# Patient Record
Sex: FEMALE | Race: WHITE | HISPANIC OR LATINO | Employment: UNEMPLOYED | URBAN - METROPOLITAN AREA
[De-identification: names, ages, dates, MRNs, and addresses within clinical notes are randomized per-mention and may not be internally consistent; named-entity substitution may affect disease eponyms.]

---

## 2020-06-29 ENCOUNTER — OFFICE VISIT (OUTPATIENT)
Dept: URGENT CARE | Facility: CLINIC | Age: 55
End: 2020-06-29
Payer: MEDICAID

## 2020-06-29 VITALS
HEART RATE: 108 BPM | SYSTOLIC BLOOD PRESSURE: 150 MMHG | DIASTOLIC BLOOD PRESSURE: 99 MMHG | TEMPERATURE: 99 F | OXYGEN SATURATION: 97 %

## 2020-06-29 DIAGNOSIS — H65.193 ACUTE MEE (MIDDLE EAR EFFUSION), BILATERAL: ICD-10-CM

## 2020-06-29 DIAGNOSIS — Z20.822 EXPOSURE TO COVID-19 VIRUS: ICD-10-CM

## 2020-06-29 DIAGNOSIS — R42 DIZZINESS: ICD-10-CM

## 2020-06-29 DIAGNOSIS — B34.9 VIRAL SYNDROME: Primary | ICD-10-CM

## 2020-06-29 PROCEDURE — 99203 OFFICE O/P NEW LOW 30 MIN: CPT | Mod: S$GLB,,, | Performed by: PHYSICIAN ASSISTANT

## 2020-06-29 PROCEDURE — U0003 INFECTIOUS AGENT DETECTION BY NUCLEIC ACID (DNA OR RNA); SEVERE ACUTE RESPIRATORY SYNDROME CORONAVIRUS 2 (SARS-COV-2) (CORONAVIRUS DISEASE [COVID-19]), AMPLIFIED PROBE TECHNIQUE, MAKING USE OF HIGH THROUGHPUT TECHNOLOGIES AS DESCRIBED BY CMS-2020-01-R: HCPCS

## 2020-06-29 PROCEDURE — 99203 PR OFFICE/OUTPT VISIT, NEW, LEVL III, 30-44 MIN: ICD-10-PCS | Mod: S$GLB,,, | Performed by: PHYSICIAN ASSISTANT

## 2020-06-29 RX ORDER — BENZONATATE 100 MG/1
100 CAPSULE ORAL 3 TIMES DAILY PRN
Qty: 30 CAPSULE | Refills: 0 | Status: SHIPPED | OUTPATIENT
Start: 2020-06-29 | End: 2020-07-09

## 2020-06-29 RX ORDER — IPRATROPIUM BROMIDE 21 UG/1
2 SPRAY, METERED NASAL 2 TIMES DAILY PRN
Qty: 30 ML | Refills: 0 | Status: SHIPPED | OUTPATIENT
Start: 2020-06-29

## 2020-06-29 RX ORDER — LISINOPRIL 20 MG/1
20 TABLET ORAL
COMMUNITY

## 2020-06-29 RX ORDER — TRAVOPROST OPHTHALMIC SOLUTION 0.04 MG/ML
1 SOLUTION OPHTHALMIC
COMMUNITY
Start: 2013-11-25

## 2020-06-29 RX ORDER — MECLIZINE HYDROCHLORIDE 25 MG/1
25 TABLET ORAL
Status: SHIPPED | OUTPATIENT
Start: 2020-06-29

## 2020-06-29 RX ORDER — MECLIZINE HYDROCHLORIDE 25 MG/1
25 TABLET ORAL 3 TIMES DAILY PRN
Qty: 30 TABLET | Refills: 0 | Status: SHIPPED | OUTPATIENT
Start: 2020-06-29

## 2020-06-29 NOTE — PROGRESS NOTES
Subjective:       Patient ID: Agnes Worley is a 55 y.o. female.    Vitals:  tympanic temperature is 98.9 °F (37.2 °C). Her blood pressure is 150/99 (abnormal) and her pulse is 108. Her oxygen saturation is 97%.     Chief Complaint: COVID-19 Concerns    55 year old female with a history of HTN and glaucoma who presents to urgent care clinic for evaluation.  Patient's  was exposed to a COVID positive patient 1.5 weeks ago.  Her  got sick last Monday.  She developed symptoms on Wednesday after taking care of him.  On Wednesday, symptoms started with generalized fatigue, body aches, headache, sore throat, runny nose, fever, and dizziness. Dizziness worsened and is associated with nausea and when she gets up or walking. Fever 102 max sat and today 99.4. light and noise worsens headache. Cough with minimal yellow sputum started 2 days ago.  She has been Taking advil and OTC supplements of acv and elderberry.  She feels like her symptoms have overall improved compared to last week.  No other associated symptoms.    Patient denies any nuchal rigidity, vision changes, hearing loss, focal weakness or deficits, or seizure activity.  Patient denies anyloss of taste/smell,  palpitations, difficulty swallowing, swollen glands, chest pain, shortness of breath, dysuria, hematuria, rectal bleeding, bladder/bowel incontinence, flank pain, abdominal pain, vomiting, or diarrhea.        Constitution: Positive for fatigue and fever. Negative for activity change, appetite change, chills, sweating and generalized weakness.   HENT: Positive for congestion, postnasal drip and sore throat. Negative for ear pain, ear discharge, hearing loss, facial swelling, sinus pain, sinus pressure, trouble swallowing and voice change.    Neck: Negative for neck pain, neck stiffness and painful lymph nodes.   Cardiovascular: Negative for chest pain, leg swelling, palpitations, sob on exertion and passing out.   Eyes: Negative for eye  discharge, eye pain, photophobia, vision loss, double vision and blurred vision.   Respiratory: Positive for cough and sputum production. Negative for chest tightness, bloody sputum, COPD, shortness of breath, stridor, wheezing and asthma.    Gastrointestinal: Positive for nausea. Negative for abdominal pain, vomiting, constipation, diarrhea, bright red blood in stool, rectal bleeding, heartburn and bowel incontinence.   Genitourinary: Negative for dysuria, frequency, urgency, urine decreased, flank pain, bladder incontinence, hematuria and history of kidney stones.   Musculoskeletal: Positive for muscle ache. Negative for trauma, joint pain, joint swelling, abnormal ROM of joint and muscle cramps.   Skin: Negative for color change, pale, rash, wound and bruising.   Allergic/Immunologic: Negative for seasonal allergies, asthma and immunocompromised state.   Neurological: Positive for dizziness and headaches. Negative for history of vertigo, light-headedness, passing out, facial drooping, speech difficulty, coordination disturbances, loss of balance, history of migraines, disorientation, altered mental status, loss of consciousness, numbness, tingling and seizures.   Hematologic/Lymphatic: Negative for swollen lymph nodes, easy bruising/bleeding and trouble clotting. Does not bruise/bleed easily.   Psychiatric/Behavioral: Negative for altered mental status, disorientation, nervous/anxious, sleep disturbance and depression. The patient is not nervous/anxious.        Objective:      Physical Exam   Constitutional: She is oriented to person, place, and time. She appears well-developed. She is cooperative.  Non-toxic appearance. She does not appear ill. No distress.   HENT:   Head: Normocephalic and atraumatic.   Right Ear: Hearing, external ear and ear canal normal. No drainage, swelling or tenderness. No mastoid tenderness. Tympanic membrane is bulging. Tympanic membrane is not erythematous. A middle ear effusion is  present.   Left Ear: Hearing, external ear and ear canal normal. No drainage, swelling or tenderness. No mastoid tenderness. Tympanic membrane is bulging. Tympanic membrane is not erythematous. A middle ear effusion is present.   Nose: No rhinorrhea or purulent discharge. Right sinus exhibits maxillary sinus tenderness. Right sinus exhibits no frontal sinus tenderness. Left sinus exhibits maxillary sinus tenderness. Left sinus exhibits no frontal sinus tenderness.   Mouth/Throat: Uvula is midline, oropharynx is clear and moist and mucous membranes are normal. Mucous membranes are moist. No trismus in the jaw. No uvula swelling. No oropharyngeal exudate, posterior oropharyngeal edema or posterior oropharyngeal erythema. Tonsils are 1+ on the right. Tonsils are 1+ on the left. No tonsillar exudate.   Eyes: Pupils are equal, round, and reactive to light. Conjunctivae, EOM and lids are normal. Right eye exhibits no discharge. Left eye exhibits no discharge. Right conjunctiva has no hemorrhage. Left conjunctiva has no hemorrhage.   Neck: Normal range of motion and full passive range of motion without pain. Neck supple. No neck rigidity.   Cardiovascular: Normal rate, regular rhythm, normal heart sounds and normal pulses.   Pulmonary/Chest: Effort normal and breath sounds normal. No accessory muscle usage or stridor. No respiratory distress. She has no wheezes. She exhibits no tenderness.   Abdominal: Soft. Normal appearance and bowel sounds are normal. She exhibits no distension and no mass. There is no splenomegaly or hepatomegaly. There is no abdominal tenderness. There is no rebound, no guarding, no tenderness at McBurney's point and negative Mata's sign.   Musculoskeletal: Normal range of motion.      Right lower leg: No edema.      Left lower leg: No edema.      Comments: 5/5 strength and ROM in all extremities.  Gait normal.   Lymphadenopathy:     She has no cervical adenopathy.   Neurological: She is alert and  oriented to person, place, and time. She has normal motor skills, normal sensation and intact cranial nerves. She displays no weakness and normal reflexes. No cranial nerve deficit or sensory deficit. She exhibits normal muscle tone. Gait and coordination normal. Coordination normal.   Skin: Skin is warm, dry and not diaphoretic. Capillary refill takes less than 2 seconds.   Psychiatric: Her behavior is normal. Thought content normal.   Nursing note and vitals reviewed.        Assessment:       1. Viral syndrome    2. Exposure to Covid-19 Virus    3. Acute VLADIMIR (middle ear effusion), bilateral    4. Dizziness          On exam, patient is nontoxic appearing and vitals are stable.  Patient is essentially neurovascularly intact on exam.  POCT clinic testing done for COVID-19 nasal swab.  COVID-19 precautions were reiterated.  We will notify patient of the results in the next 24-72 hours; can receive results on [facility]. Patient was prescribed medications and recommended OTC treatments for their symptoms.  She was prescribed Meclizine prn dizziness, atrovent for congestion and middle ear effusion, tessalon as needed for for cough, and otc allergy medication with decongestant.      Patient was instructed to return for re-evaluation for any worsening or change in current symptoms. Strict ED versus clinic precautions given in depth. Discharge and follow-up instructions given verbally/printed with the patient who expressed understanding and willingness to comply with my recommendations.  Patient verbalized understanding and agreed with the entirety of plan of care.    Plan:         Viral syndrome  -     COVID-19 Routine Screening    Exposure to Covid-19 Virus  -     COVID-19 Routine Screening    Acute VLADIMIR (middle ear effusion), bilateral  -     meclizine tablet 25 mg  -     ipratropium (ATROVENT) 0.03 % nasal spray; 2 sprays by Nasal route 2 (two) times daily as needed.  Dispense: 30 mL; Refill: 0  -     benzonatate  (TESSALON) 100 MG capsule; Take 1 capsule (100 mg total) by mouth 3 (three) times daily as needed for Cough.  Dispense: 30 capsule; Refill: 0  -     meclizine (ANTIVERT) 25 mg tablet; Take 1 tablet (25 mg total) by mouth 3 (three) times daily as needed for Dizziness or Nausea.  Dispense: 30 tablet; Refill: 0    Dizziness  -     meclizine tablet 25 mg  -     meclizine (ANTIVERT) 25 mg tablet; Take 1 tablet (25 mg total) by mouth 3 (three) times daily as needed for Dizziness or Nausea.  Dispense: 30 tablet; Refill: 0           Patient Instructions       PLEASE READ YOUR DISCHARGE INSTRUCTIONS ENTIRELY AS IT CONTAINS IMPORTANT INFORMATION.    Patient had covid testing done today.  We will notify you of your results in the next 1-3 days. You can also receive the results on my chart. Quarantine at home until you receive results.  Patient understand that they cannot return to work until they are given their results and further recommendations.   If Negative: symptom free without fever reducing meds in 24 hours - can go back to work in 24 hours with surgical mask for 14 days.  If Positive: 3 days since improved symptoms, no fever without fever reducing meds - have to be out for a minimum of 10 days passed from when symptoms first appeared with improvements in respiratory symptoms.    If patient is asymptomatic, patient will still need to be quarantine for at least 10 days from exact known exposure date OR from positive test results date.    Discussed corona virus precautions and reviewed CDC FAC; printed a copy for patient.  I discussed to continue to monitor their symptoms. Discussed that if their symptoms persist or worsen to seek re-evaluation. Clinic vs. ER precautions were given.  Patient verbalized understanding and agreed with the above plan of care.    - Rest.  Drink plenty of fluids.    - Tylenol as directed as needed for fever/pain.  For Tylenol, do not exceed 4000 mg/ day.   -take allergy medicine with  decongestant  - take meclizine as needed for nausea or dizziness      -Below are suggestions for symptomatic relief:              -Nasal saline spray reduces inflammation and dryness.              -Warm face compresses to help with facial sinus pain/pressure.              -Vicks vapor rub at night.              -ATROVENT NASAL SPRAY OR Flonase OTC or Nasacort OTC for nasal congestion.              -Simple foods like chicken noodle soup.              -Delsym helps with coughing at night              -Zyrtec/Claritin during the day & Benadryl at night may help with allergies.  -If you DO NOT have Hypertension or any history of palpitations, it is ok to take over the counter Sudafed or Mucinex D or Allegra-D or Claritin-D or Zyrtec-D.  -If you do take one of the above, it is ok to combine that with plain over the counter Mucinex or Allegra or Claritin or Zyrtec. If, for example, you are taking Zyrtec -D, you can combine that with Mucinex, but not Mucinex-D.  If you are taking Mucinex-D, you can combine that with plain Allegra or Claritin or Zyrtec.   -If you DO have Hypertension or palpitations, it is safe to take Coricidin HBP for relief of sinus symptoms.      -You must understand that you've received an Urgent Care treatment only and that you may be released before all your medical problems are known or treated. You, the patient, will    arrange for follow up care as instructed. Please arrange follow up with your primary medical clinic within 2-5 days if your signs and symptoms have not resolved or worsen.   - Follow up with your PCP or specialty clinic as directed.  You can call [phone] or [phone] to schedule an appointment with the appropriate provider.    - If your condition worsens or fails to improve we recommend that you receive another evaluation at the emergency room immediately or contact your primary medical clinic to discuss your concerns.                Instructions for Patients Awaiting  COVID-19 Test Results    You will either be called with your test result or it will be released to the patient portal.  If you have any questions about your test, please visit [email or web address] or call our COVID-19 information line at [phone].    Prevention steps for patients with confirmed or suspected COVID-19       Stay home and stay away from family members and friends. The CDC says, you can leave home after these three things have happened: 1) You have had no fever for at least 72 hours (that is three full days of no fever without the use of medicine that reduces fevers) 2) AND other symptoms have improved (for example, when your cough or shortness of breath have improved) 3) AND at least 10 days have passed since your symptoms first appeared.   Separate yourself from other people and animals in your home.   Call ahead before visiting your doctor.   Wear a facemask.   Cover your coughs and sneezes.   Wash your hands often with soap and water; hand  can be used, too.   Avoid sharing personal household items.   Wipe down surfaces used daily.   Monitor your symptoms. Seek prompt medical attention if your illness is worsening (e.g., difficulty breathing).    Before seeking care, call your healthcare provider.   If you have a medical emergency and need to call 911, notify the dispatch personnel that you have, or are being evaluated for COVID-19. If possible, put on a facemask before emergency medical services arrive.        Recommended precautions for household members, intimate partners, and caregivers in a home setting of a patient with symptomatic laboratory-confirmed COVID-19 or a patient under investigation.  Household members, intimate partners, and caregivers in the home setting awaiting tests results have close contact with a person with symptomatic, laboratory-confirmed COVID-19 or a person under investigation. Close contacts should monitor their health; they should call  their provider right away if they develop symptoms suggestive of COVID-19 (e.g., fever, cough, shortness of breath).    Close contacts should also follow these recommendations:   Make sure that you understand and can help the patient follow their provider's instructions for medication(s) and care. You should help the patient with basic needs in the home and provide support for getting groceries, prescriptions, and other personal needs.   Monitor the patient's symptoms. If the patient is getting sicker, call his or her healthcare provider and tell them that the patient has laboratory-confirmed COVID-19. If the patient has a medical emergency and you need to call 911, notify the dispatch personnel that the patient has, or is being evaluated for COVID-19.   Household members should stay in another room or be  from the patient. Household members should use a separate bedroom and bathroom, if available.   Prohibit visitors.   Household members should care for any pets in the home.   Make sure that shared spaces in the home have good air flow, such as by an air conditioner or an opened window, weather permitting.   Perform hand hygiene frequently. Wash your hands often with soap and water for at least 20 seconds or use an alcohol-based hand  (that contains > 60% alcohol) covering all surfaces of your hands and rubbing them together until they feel dry. Soap and water should be used preferentially.   Avoid touching your eyes, nose, and mouth.   The patient should wear a facemask. If the patient is not able to wear a facemask (for example, because it causes trouble breathing), caregivers should wear a mask when they are in the same room as the patient.   Wear a disposable facemask and gloves when you touch or have contact with the patient's blood, stool, or body fluids, such as saliva, sputum, nasal mucus, vomit, urine.  o Throw out disposable facemasks and gloves after using them. Do not  reuse.  o When removing personal protective equipment, first remove and dispose of gloves. Then, immediately clean your hands with soap and water or alcohol-based hand . Next, remove and dispose of facemask, and immediately clean your hands again with soap and water or alcohol-based hand .   You should not share dishes, drinking glasses, cups, eating utensils, towels, bedding, or other items with the patient. After the patient uses these items, you should wash them thoroughly (see below Wash laundry thoroughly).   Clean all high-touch surfaces, such as counters, tabletops, doorknobs, bathroom fixtures, toilets, phones, keyboards, tablets, and bedside tables, every day. Also, clean any surfaces that may have blood, stool, or body fluids on them.   Use a household cleaning spray or wipe, according to the label instructions. Labels contain instructions for safe and effective use of the cleaning product including precautions you should take when applying the product, such as wearing gloves and making sure you have good ventilation during use of the product.   Wash laundry thoroughly.  o Immediately remove and wash clothes or bedding that have blood, stool, or body fluids on them.  o Wear disposable gloves while handling soiled items and keep soiled items away from your body. Clean your hands (with soap and water or an alcohol-based hand ) immediately after removing your gloves.  o Read and follow directions on labels of laundry or clothing items and detergent. In general, using a normal laundry detergent according to washing machine instructions and dry thoroughly using the warmest temperatures recommended on the clothing label.   Place all used disposable gloves, facemasks, and other contaminated items in a lined container before disposing of them with other household waste. Clean your hands (with soap and water or an alcohol-based hand ) immediately after handling these  items. Soap and water should be used preferentially if hands are visibly dirty.   Discuss any additional questions with your state or local health department or healthcare provider. Check available hours when contacting your local health department.    For more information see CDC link below.      https://www.cdc.gov/coronavirus/2019-ncov/hcp/guidance-prevent-spread.html#precautions        Sources:  [facility], [facility]          Instructions for Home Care of Patients and Caretakers with Coronavirus Disease 2019     Limit visitors to the home.  Older persons and those that have chronic medical conditions such as diabetes, lung and heart disease are at increased risk for illness.    If possible, patients should use a separate bedroom while recovering. Caregivers and household members should avoid prolonged contact with the patient which means to stay 6 feet away and avoid contact with cough droplets.  When close contact is necessary, wash your hands before and immediately after contact.    Perform hand hygiene frequently. Wash your hands often with soap and water for at least 20 seconds or use an alcohol-based hand , covering all surfaces of your hands and rubbing them together until they feel dry.    Avoid touching your eyes, nose, and mouth with unwashed hands.   Avoid sharing household items with the patient. You should not share dishes, drinking glasses, cups, eating utensils, towels, bedding, or other items. After the patient uses these items, you should wash them thoroughly.   Wash laundry thoroughly.   o Immediately remove and wash clothes or bedding that have blood, stool, or body fluids on them.   Clean all high-touch surfaces, such as counters, tabletops, doorknobs, bathroom fixtures, toilets, phones, keyboards, tablets, and bedside tables, every day.   o Use a household cleaning spray or wipe, according to the label instructions. Labels contain instructions  for safe and effective use of the cleaning product including precautions you should take when applying the product, such as wearing gloves and making sure you have good ventilation during use of the product.    For more information see CDC link below.      https://www.cdc.gov/coronavirus/2019-ncov/hcp/guidance-prevent-spread.html#precautions               If your symptoms worsen or if you have any other concerns, please contact [facility] at [phone].                  Problemas del oído interno: causas del mareo (vértigo)       Vértigo postural angel (BPV, por stephanie siglas en inglés)  Esta es la causa más común de vértigo. El BPV (denominado también BPPV, o vértigo postural paroxístico angel) se produce cuando los hao presentes en los conductos se desplazan a henri posición equivocada. Los episodios suelen suceder cuando la cyndi se mueve de cierta manera, freddie por ejemplo al nubia henri vuelta en la cama, agacharse o mirar hacia arriba. Puesto que el BPV aparece rápidamente, las personas que lo sufren deben considerar si es seguro que manejen o que gillian otras actividades que requieren atención charito:  El BPV:  · Causa episodios de vértigo que solis unos segundos. Pueden producirse varias veces al día, según la posición del cuerpo.  · No provoca pérdida de la audición.  · A menudo se soluciona por alberto cuenta, aunque puede desaparecer más rápido con un tratamiento.  Infección o inflamación  A veces, los conductos semicirculares se hinchan y envían señales de equilibrio incorrectas. Denise problema puede deberse a henri infección viral. Según la causa, la audición puede verse afectada (laberintitis) o permanecer normal (neuronitis). La infección o inflamación:  · Causa episodios de vértigo que solis unas horas o varios días. Generalmente, el primer episodio es el peor.  · Puede producir pérdida de la audición.  · A menudo se tacho por alberto cuenta, aunque puede desaparecer más rápido con un  tratamiento.  · Puede requerir rehabilitación vestibular si usted tiene problemas de equilibrio persistentes.  Enfermedad de Meniere  Esta afección poco frecuente se produce cuando hay demasiado líquido en los conductos. Denise líquido aumenta la presión y causa hinchazón, lo que afecta el equilibrio y las señales auditivas. La enfermedad de Meniere puede:  · Causar episodios de vértigo que solis horas.  · Causar problemas variables de la audición, generalmente en un oído, que empeoran con el tiempo.  · Causar zumbidos o pitidos en los oídos (acúfenos o tinitus).  · Causar henri sensación de llenura o presión en el oído.  · Causar que cualquiera de estos síntomas (vértigo, pérdida de audición, acúfenos o sensación de llenura en los oídos) persista de por betty.  Date Last Reviewed: 5/19/2014  © 3238-2930 [employer]. [address] [place] [address], [place] [address]. Todos los derechos reservados. Esta información no pretende sustituir la atención médica profesional. Sólo alberto médico puede diagnosticar y tratar un problema de paulette.

## 2020-06-29 NOTE — PATIENT INSTRUCTIONS
PLEASE READ YOUR DISCHARGE INSTRUCTIONS ENTIRELY AS IT CONTAINS IMPORTANT INFORMATION.    Patient had covid testing done today.  We will notify you of your results in the next 1-3 days. You can also receive the results on my chart. Quarantine at home until you receive results.  Patient understand that they cannot return to work until they are given their results and further recommendations.   If Negative: symptom free without fever reducing meds in 24 hours - can go back to work in 24 hours with surgical mask for 14 days.  If Positive: 3 days since improved symptoms, no fever without fever reducing meds - have to be out for a minimum of 10 days passed from when symptoms first appeared with improvements in respiratory symptoms.    If patient is asymptomatic, patient will still need to be quarantine for at least 10 days from exact known exposure date OR from positive test results date.    Discussed corona virus precautions and reviewed CDC FAC; printed a copy for patient.  I discussed to continue to monitor their symptoms. Discussed that if their symptoms persist or worsen to seek re-evaluation. Clinic vs. ER precautions were given.  Patient verbalized understanding and agreed with the above plan of care.    - Rest.  Drink plenty of fluids.    - Tylenol as directed as needed for fever/pain.  For Tylenol, do not exceed 4000 mg/ day.   -take allergy medicine with decongestant  - take meclizine as needed for nausea or dizziness      -Below are suggestions for symptomatic relief:              -Nasal saline spray reduces inflammation and dryness.              -Warm face compresses to help with facial sinus pain/pressure.              -Vicks vapor rub at night.              -ATROVENT NASAL SPRAY OR Flonase OTC or Nasacort OTC for nasal congestion.              -Simple foods like chicken noodle soup.              -Delsym helps with coughing at night              -Zyrtec/Claritin during the day & Benadryl at night may help  with allergies.  -If you DO NOT have Hypertension or any history of palpitations, it is ok to take over the counter Sudafed or Mucinex D or Allegra-D or Claritin-D or Zyrtec-D.  -If you do take one of the above, it is ok to combine that with plain over the counter Mucinex or Allegra or Claritin or Zyrtec. If, for example, you are taking Zyrtec -D, you can combine that with Mucinex, but not Mucinex-D.  If you are taking Mucinex-D, you can combine that with plain Allegra or Claritin or Zyrtec.   -If you DO have Hypertension or palpitations, it is safe to take Coricidin HBP for relief of sinus symptoms.      -You must understand that you've received an Urgent Care treatment only and that you may be released before all your medical problems are known or treated. You, the patient, will    arrange for follow up care as instructed. Please arrange follow up with your primary medical clinic within 2-5 days if your signs and symptoms have not resolved or worsen.   - Follow up with your PCP or specialty clinic as directed.  You can call [phone] or [phone] to schedule an appointment with the appropriate provider.    - If your condition worsens or fails to improve we recommend that you receive another evaluation at the emergency room immediately or contact your primary medical clinic to discuss your concerns.                Instructions for Patients Awaiting COVID-19 Test Results    You will either be called with your test result or it will be released to the patient portal.  If you have any questions about your test, please visit [email or web address] or call our COVID-19 information line at [phone].    Prevention steps for patients with confirmed or suspected COVID-19       Stay home and stay away from family members and friends. The CDC says, you can leave home after these three things have happened: 1) You have had no fever for at least 72 hours (that is three full days of no fever without the use  of medicine that reduces fevers) 2) AND other symptoms have improved (for example, when your cough or shortness of breath have improved) 3) AND at least 10 days have passed since your symptoms first appeared.   Separate yourself from other people and animals in your home.   Call ahead before visiting your doctor.   Wear a facemask.   Cover your coughs and sneezes.   Wash your hands often with soap and water; hand  can be used, too.   Avoid sharing personal household items.   Wipe down surfaces used daily.   Monitor your symptoms. Seek prompt medical attention if your illness is worsening (e.g., difficulty breathing).    Before seeking care, call your healthcare provider.   If you have a medical emergency and need to call 911, notify the dispatch personnel that you have, or are being evaluated for COVID-19. If possible, put on a facemask before emergency medical services arrive.        Recommended precautions for household members, intimate partners, and caregivers in a home setting of a patient with symptomatic laboratory-confirmed COVID-19 or a patient under investigation.  Household members, intimate partners, and caregivers in the home setting awaiting tests results have close contact with a person with symptomatic, laboratory-confirmed COVID-19 or a person under investigation. Close contacts should monitor their health; they should call their provider right away if they develop symptoms suggestive of COVID-19 (e.g., fever, cough, shortness of breath).    Close contacts should also follow these recommendations:   Make sure that you understand and can help the patient follow their provider's instructions for medication(s) and care. You should help the patient with basic needs in the home and provide support for getting groceries, prescriptions, and other personal needs.   Monitor the patient's symptoms. If the patient is getting sicker, call his or her healthcare provider and tell them that  the patient has laboratory-confirmed COVID-19. If the patient has a medical emergency and you need to call 911, notify the dispatch personnel that the patient has, or is being evaluated for COVID-19.   Household members should stay in another room or be  from the patient. Household members should use a separate bedroom and bathroom, if available.   Prohibit visitors.   Household members should care for any pets in the home.   Make sure that shared spaces in the home have good air flow, such as by an air conditioner or an opened window, weather permitting.   Perform hand hygiene frequently. Wash your hands often with soap and water for at least 20 seconds or use an alcohol-based hand  (that contains > 60% alcohol) covering all surfaces of your hands and rubbing them together until they feel dry. Soap and water should be used preferentially.   Avoid touching your eyes, nose, and mouth.   The patient should wear a facemask. If the patient is not able to wear a facemask (for example, because it causes trouble breathing), caregivers should wear a mask when they are in the same room as the patient.   Wear a disposable facemask and gloves when you touch or have contact with the patient's blood, stool, or body fluids, such as saliva, sputum, nasal mucus, vomit, urine.  o Throw out disposable facemasks and gloves after using them. Do not reuse.  o When removing personal protective equipment, first remove and dispose of gloves. Then, immediately clean your hands with soap and water or alcohol-based hand . Next, remove and dispose of facemask, and immediately clean your hands again with soap and water or alcohol-based hand .   You should not share dishes, drinking glasses, cups, eating utensils, towels, bedding, or other items with the patient. After the patient uses these items, you should wash them thoroughly (see below Wash laundry thoroughly).   Clean all high-touch  surfaces, such as counters, tabletops, doorknobs, bathroom fixtures, toilets, phones, keyboards, tablets, and bedside tables, every day. Also, clean any surfaces that may have blood, stool, or body fluids on them.   Use a household cleaning spray or wipe, according to the label instructions. Labels contain instructions for safe and effective use of the cleaning product including precautions you should take when applying the product, such as wearing gloves and making sure you have good ventilation during use of the product.   Wash laundry thoroughly.  o Immediately remove and wash clothes or bedding that have blood, stool, or body fluids on them.  o Wear disposable gloves while handling soiled items and keep soiled items away from your body. Clean your hands (with soap and water or an alcohol-based hand ) immediately after removing your gloves.  o Read and follow directions on labels of laundry or clothing items and detergent. In general, using a normal laundry detergent according to washing machine instructions and dry thoroughly using the warmest temperatures recommended on the clothing label.   Place all used disposable gloves, facemasks, and other contaminated items in a lined container before disposing of them with other household waste. Clean your hands (with soap and water or an alcohol-based hand ) immediately after handling these items. Soap and water should be used preferentially if hands are visibly dirty.   Discuss any additional questions with your state or local health department or healthcare provider. Check available hours when contacting your local health department.    For more information see CDC link below.      https://www.cdc.gov/coronavirus/2019-ncov/hcp/guidance-prevent-spread.html#precautions        Sources:  [facility]          Instructions for Home Care of Patients and Caretakers with Coronavirus Disease 2019     Limit visitors  to the home.  Older persons and those that have chronic medical conditions such as diabetes, lung and heart disease are at increased risk for illness.    If possible, patients should use a separate bedroom while recovering. Caregivers and household members should avoid prolonged contact with the patient which means to stay 6 feet away and avoid contact with cough droplets.  When close contact is necessary, wash your hands before and immediately after contact.    Perform hand hygiene frequently. Wash your hands often with soap and water for at least 20 seconds or use an alcohol-based hand , covering all surfaces of your hands and rubbing them together until they feel dry.    Avoid touching your eyes, nose, and mouth with unwashed hands.   Avoid sharing household items with the patient. You should not share dishes, drinking glasses, cups, eating utensils, towels, bedding, or other items. After the patient uses these items, you should wash them thoroughly.   Wash laundry thoroughly.   o Immediately remove and wash clothes or bedding that have blood, stool, or body fluids on them.   Clean all high-touch surfaces, such as counters, tabletops, doorknobs, bathroom fixtures, toilets, phones, keyboards, tablets, and bedside tables, every day.   o Use a household cleaning spray or wipe, according to the label instructions. Labels contain instructions for safe and effective use of the cleaning product including precautions you should take when applying the product, such as wearing gloves and making sure you have good ventilation during use of the product.    For more information see CDC link below.      https://www.cdc.gov/coronavirus/2019-ncov/hcp/guidance-prevent-spread.html#precautions               If your symptoms worsen or if you have any other concerns, please contact [facility] at [phone].                  Problemas del oído interno: causas del mareo (vértigo)       Vértigo postural angel  (BPV, por stephanie siglas en inglés)  Esta es la causa más común de vértigo. El BPV (denominado también BPPV, o vértigo postural paroxístico angel) se produce cuando los hao presentes en los conductos se desplazan a henri posición equivocada. Los episodios suelen suceder cuando la cyndi se mueve de cierta manera, freddie por ejemplo al nubia henri vuelta en la cama, agacharse o mirar hacia arriba. Puesto que el BPV aparece rápidamente, las personas que lo sufren deben considerar si es seguro que manejen o que gillian otras actividades que requieren atención charito:  El BPV:  · Causa episodios de vértigo que solis unos segundos. Pueden producirse varias veces al día, según la posición del cuerpo.  · No provoca pérdida de la audición.  · A menudo se soluciona por alberto cuenta, aunque puede desaparecer más rápido con un tratamiento.  Infección o inflamación  A veces, los conductos semicirculares se hinchan y envían señales de equilibrio incorrectas. Denise problema puede deberse a henri infección viral. Según la causa, la audición puede verse afectada (laberintitis) o permanecer normal (neuronitis). La infección o inflamación:  · Causa episodios de vértigo que solis unas horas o varios días. Generalmente, el primer episodio es el peor.  · Puede producir pérdida de la audición.  · A menudo se tacho por alberto cuenta, aunque puede desaparecer más rápido con un tratamiento.  · Puede requerir rehabilitación vestibular si usted tiene problemas de equilibrio persistentes.  Enfermedad de Meniere  Esta afección poco frecuente se produce cuando hay demasiado líquido en los conductos. Denise líquido aumenta la presión y causa hinchazón, lo que afecta el equilibrio y las señales auditivas. La enfermedad de Meniere puede:  · Causar episodios de vértigo que solis horas.  · Causar problemas variables de la audición, generalmente en un oído, que empeoran con el tiempo.  · Causar zumbidos o pitidos en los oídos (acúfenos o tinitus).  · Causar henri  sensación de llenura o presión en el oído.  · Causar que cualquiera de estos síntomas (vértigo, pérdida de audición, acúfenos o sensación de llenura en los oídos) persista de por betty.  Date Last Reviewed: 5/19/2014  © 9563-8036 [employer]. [address] [place] [address], [place] [address]. Todos los derechos reservados. Esta información no pretende sustituir la atención médica profesional. Sólo alberto médico puede diagnosticar y tratar un problema de paulette.

## 2020-07-03 ENCOUNTER — TELEPHONE (OUTPATIENT)
Dept: URGENT CARE | Facility: CLINIC | Age: 55
End: 2020-07-03

## 2020-07-03 DIAGNOSIS — U07.1 COVID-19 VIRUS DETECTED: ICD-10-CM

## 2020-07-03 LAB — SARS-COV-2 RNA RESP QL NAA+PROBE: DETECTED

## 2020-07-03 RX ORDER — ALBUTEROL SULFATE 90 UG/1
2 AEROSOL, METERED RESPIRATORY (INHALATION) EVERY 6 HOURS PRN
Qty: 8 G | Refills: 0 | Status: SHIPPED | OUTPATIENT
Start: 2020-07-03 | End: 2021-07-03

## 2020-07-03 NOTE — TELEPHONE ENCOUNTER
Called patient to discuss her positive COVID-19 results on 06/19/2020.  Patient reports of significant improvements in her symptoms.  No longer having fever but still having generalized fatigue.  She feels like she is fatigue and has increased work of breathing when she is cleaning the house or with increased physical activity.  Denies any shortness of breath, palpitations, chest pain, or other complaints.  I recommended patient to continue previously prescribed medications on previous visit.  I also sent and albuterol inhaler as needed.  I reiterated CDC guidelines and precautions for COVID-19.  I emphasized strict ER and clinic precautions.  If she develops any difficulty breathing, chest pain, or syncopal episodes, she will need to be evaluated in the ED.  Patient verbalized understanding and agreed with plan of care.

## 2020-08-14 ENCOUNTER — LAB VISIT (OUTPATIENT)
Dept: LAB | Facility: HOSPITAL | Age: 55
End: 2020-08-14
Payer: MEDICAID

## 2020-08-14 DIAGNOSIS — N95.1 SYMPTOMATIC MENOPAUSAL OR FEMALE CLIMACTERIC STATES: ICD-10-CM

## 2020-08-14 DIAGNOSIS — I10 HYPERTENSION: ICD-10-CM

## 2020-08-14 DIAGNOSIS — M13.841 OTHER SPECIFIED ARTHRITIS, RIGHT HAND: Primary | ICD-10-CM

## 2020-08-14 LAB
ANION GAP SERPL CALC-SCNC: 6 MMOL/L (ref 8–16)
BASOPHILS # BLD AUTO: 0.05 K/UL (ref 0–0.2)
BASOPHILS NFR BLD: 0.9 % (ref 0–1.9)
BUN SERPL-MCNC: 8 MG/DL (ref 6–20)
CALCIUM SERPL-MCNC: 9.3 MG/DL (ref 8.7–10.5)
CHLORIDE SERPL-SCNC: 110 MMOL/L (ref 95–110)
CHOLEST SERPL-MCNC: 233 MG/DL (ref 120–199)
CHOLEST/HDLC SERPL: 3.8 {RATIO} (ref 2–5)
CO2 SERPL-SCNC: 25 MMOL/L (ref 23–29)
CREAT SERPL-MCNC: 0.7 MG/DL (ref 0.5–1.4)
DIFFERENTIAL METHOD: NORMAL
EOSINOPHIL # BLD AUTO: 0.2 K/UL (ref 0–0.5)
EOSINOPHIL NFR BLD: 3.8 % (ref 0–8)
ERYTHROCYTE [DISTWIDTH] IN BLOOD BY AUTOMATED COUNT: 12.7 % (ref 11.5–14.5)
ERYTHROCYTE [SEDIMENTATION RATE] IN BLOOD BY WESTERGREN METHOD: 13 MM/HR (ref 0–36)
EST. GFR  (AFRICAN AMERICAN): >60 ML/MIN/1.73 M^2
EST. GFR  (NON AFRICAN AMERICAN): >60 ML/MIN/1.73 M^2
ESTIMATED AVG GLUCOSE: 111 MG/DL (ref 68–131)
ESTRADIOL SERPL-MCNC: <10 PG/ML
GLUCOSE SERPL-MCNC: 92 MG/DL (ref 70–110)
HBA1C MFR BLD HPLC: 5.5 % (ref 4–5.6)
HCT VFR BLD AUTO: 44.1 % (ref 37–48.5)
HDLC SERPL-MCNC: 62 MG/DL (ref 40–75)
HDLC SERPL: 26.6 % (ref 20–50)
HGB BLD-MCNC: 14.2 G/DL (ref 12–16)
IMM GRANULOCYTES # BLD AUTO: 0.01 K/UL (ref 0–0.04)
IMM GRANULOCYTES NFR BLD AUTO: 0.2 % (ref 0–0.5)
LDLC SERPL CALC-MCNC: 153.4 MG/DL (ref 63–159)
LYMPHOCYTES # BLD AUTO: 2.5 K/UL (ref 1–4.8)
LYMPHOCYTES NFR BLD: 42.7 % (ref 18–48)
MCH RBC QN AUTO: 27.4 PG (ref 27–31)
MCHC RBC AUTO-ENTMCNC: 32.2 G/DL (ref 32–36)
MCV RBC AUTO: 85 FL (ref 82–98)
MONOCYTES # BLD AUTO: 0.5 K/UL (ref 0.3–1)
MONOCYTES NFR BLD: 9 % (ref 4–15)
NEUTROPHILS # BLD AUTO: 2.5 K/UL (ref 1.8–7.7)
NEUTROPHILS NFR BLD: 43.4 % (ref 38–73)
NONHDLC SERPL-MCNC: 171 MG/DL
NRBC BLD-RTO: 0 /100 WBC
PLATELET # BLD AUTO: 269 K/UL (ref 150–350)
PMV BLD AUTO: 9.5 FL (ref 9.2–12.9)
POTASSIUM SERPL-SCNC: 4.1 MMOL/L (ref 3.5–5.1)
PROGEST SERPL-MCNC: 0.3 NG/ML
RBC # BLD AUTO: 5.19 M/UL (ref 4–5.4)
RHEUMATOID FACT SERPL-ACNC: <10 IU/ML (ref 0–15)
SODIUM SERPL-SCNC: 141 MMOL/L (ref 136–145)
T3FREE SERPL-MCNC: 3.3 PG/ML (ref 2.3–4.2)
T4 FREE SERPL-MCNC: 1.03 NG/DL (ref 0.71–1.51)
TESTOST SERPL-MCNC: 20 NG/DL (ref 5–73)
TRIGL SERPL-MCNC: 88 MG/DL (ref 30–150)
TSH SERPL DL<=0.005 MIU/L-ACNC: 2.1 UIU/ML (ref 0.4–4)
WBC # BLD AUTO: 5.79 K/UL (ref 3.9–12.7)

## 2020-08-14 PROCEDURE — 80048 BASIC METABOLIC PNL TOTAL CA: CPT

## 2020-08-14 PROCEDURE — 85025 COMPLETE CBC W/AUTO DIFF WBC: CPT

## 2020-08-14 PROCEDURE — 85652 RBC SED RATE AUTOMATED: CPT

## 2020-08-14 PROCEDURE — 84443 ASSAY THYROID STIM HORMONE: CPT

## 2020-08-14 PROCEDURE — 86431 RHEUMATOID FACTOR QUANT: CPT

## 2020-08-14 PROCEDURE — 84144 ASSAY OF PROGESTERONE: CPT

## 2020-08-14 PROCEDURE — 86038 ANTINUCLEAR ANTIBODIES: CPT

## 2020-08-14 PROCEDURE — 80061 LIPID PANEL: CPT

## 2020-08-14 PROCEDURE — 84403 ASSAY OF TOTAL TESTOSTERONE: CPT

## 2020-08-14 PROCEDURE — 82670 ASSAY OF TOTAL ESTRADIOL: CPT

## 2020-08-14 PROCEDURE — 84439 ASSAY OF FREE THYROXINE: CPT

## 2020-08-14 PROCEDURE — 84479 ASSAY OF THYROID (T3 OR T4): CPT

## 2020-08-14 PROCEDURE — 83036 HEMOGLOBIN GLYCOSYLATED A1C: CPT

## 2020-08-14 PROCEDURE — 84481 FREE ASSAY (FT-3): CPT

## 2020-08-16 LAB — T3RU NFR SERPL: 33 % (ref 28–41)

## 2020-08-18 LAB — ANA SER QL IF: NORMAL

## 2020-09-19 ENCOUNTER — LAB VISIT (OUTPATIENT)
Dept: LAB | Facility: HOSPITAL | Age: 55
End: 2020-09-19
Payer: MEDICAID

## 2020-09-19 DIAGNOSIS — N95.1 SYMPTOMATIC MENOPAUSAL OR FEMALE CLIMACTERIC STATES: ICD-10-CM

## 2020-09-19 DIAGNOSIS — E78.01 FAMILIAL HYPERBETALIPOPROTEINEMIA: ICD-10-CM

## 2020-09-19 DIAGNOSIS — R73.9 HYPERGLYCEMIA: Primary | ICD-10-CM

## 2020-09-19 DIAGNOSIS — R73.9 HYPERGLYCEMIA: ICD-10-CM

## 2020-09-19 LAB
ANION GAP SERPL CALC-SCNC: 9 MMOL/L (ref 8–16)
BUN SERPL-MCNC: 7 MG/DL (ref 6–20)
CALCIUM SERPL-MCNC: 8.5 MG/DL (ref 8.7–10.5)
CHLORIDE SERPL-SCNC: 108 MMOL/L (ref 95–110)
CHOLEST SERPL-MCNC: 220 MG/DL (ref 120–199)
CHOLEST/HDLC SERPL: 3.2 {RATIO} (ref 2–5)
CO2 SERPL-SCNC: 24 MMOL/L (ref 23–29)
CREAT SERPL-MCNC: 0.7 MG/DL (ref 0.5–1.4)
EST. GFR  (AFRICAN AMERICAN): >60 ML/MIN/1.73 M^2
EST. GFR  (NON AFRICAN AMERICAN): >60 ML/MIN/1.73 M^2
ESTRADIOL SERPL-MCNC: 185 PG/ML
GLUCOSE SERPL-MCNC: 96 MG/DL (ref 70–110)
HDLC SERPL-MCNC: 68 MG/DL (ref 40–75)
HDLC SERPL: 30.9 % (ref 20–50)
LDLC SERPL CALC-MCNC: 129 MG/DL (ref 63–159)
NONHDLC SERPL-MCNC: 152 MG/DL
POTASSIUM SERPL-SCNC: 3.9 MMOL/L (ref 3.5–5.1)
SODIUM SERPL-SCNC: 141 MMOL/L (ref 136–145)
TRIGL SERPL-MCNC: 115 MG/DL (ref 30–150)

## 2020-09-19 PROCEDURE — 36415 COLL VENOUS BLD VENIPUNCTURE: CPT

## 2020-09-19 PROCEDURE — 82670 ASSAY OF TOTAL ESTRADIOL: CPT

## 2020-09-19 PROCEDURE — 80061 LIPID PANEL: CPT

## 2020-09-19 PROCEDURE — 80048 BASIC METABOLIC PNL TOTAL CA: CPT

## 2020-12-28 DIAGNOSIS — R05.9 COUGH: Primary | ICD-10-CM

## 2020-12-30 ENCOUNTER — HOSPITAL ENCOUNTER (OUTPATIENT)
Dept: RADIOLOGY | Facility: HOSPITAL | Age: 55
Discharge: HOME | End: 2020-12-30
Attending: INTERNAL MEDICINE
Payer: MEDICAID

## 2020-12-30 DIAGNOSIS — R05.9 COUGH: ICD-10-CM

## 2020-12-30 PROCEDURE — 71046 X-RAY EXAM CHEST 2 VIEWS: CPT | Mod: 26,,, | Performed by: RADIOLOGY

## 2020-12-30 PROCEDURE — 71046 X-RAY EXAM CHEST 2 VIEWS: CPT | Mod: TC

## 2020-12-30 PROCEDURE — 71046 XR CHEST PA AND LATERAL: ICD-10-PCS | Mod: 26,,, | Performed by: RADIOLOGY

## 2021-01-04 ENCOUNTER — LAB VISIT (OUTPATIENT)
Dept: LAB | Facility: HOSPITAL | Age: 56
End: 2021-01-04
Attending: INTERNAL MEDICINE
Payer: MEDICAID

## 2021-01-04 DIAGNOSIS — I10 BENIGN ESSENTIAL HYPERTENSION: Primary | ICD-10-CM

## 2021-01-04 DIAGNOSIS — E78.00 PURE HYPERCHOLESTEROLEMIA: ICD-10-CM

## 2021-01-04 LAB
ANION GAP SERPL CALC-SCNC: 10 MMOL/L (ref 8–16)
BUN SERPL-MCNC: 6 MG/DL (ref 6–20)
CALCIUM SERPL-MCNC: 8.8 MG/DL (ref 8.7–10.5)
CHLORIDE SERPL-SCNC: 107 MMOL/L (ref 95–110)
CHOLEST SERPL-MCNC: 209 MG/DL (ref 120–199)
CHOLEST/HDLC SERPL: 3.5 {RATIO} (ref 2–5)
CO2 SERPL-SCNC: 26 MMOL/L (ref 23–29)
CREAT SERPL-MCNC: 0.6 MG/DL (ref 0.5–1.4)
EST. GFR  (AFRICAN AMERICAN): >60 ML/MIN/1.73 M^2
EST. GFR  (NON AFRICAN AMERICAN): >60 ML/MIN/1.73 M^2
GLUCOSE SERPL-MCNC: 83 MG/DL (ref 70–110)
HDLC SERPL-MCNC: 59 MG/DL (ref 40–75)
HDLC SERPL: 28.2 % (ref 20–50)
LDLC SERPL CALC-MCNC: 107.4 MG/DL (ref 63–159)
NONHDLC SERPL-MCNC: 150 MG/DL
POTASSIUM SERPL-SCNC: 3.8 MMOL/L (ref 3.5–5.1)
SODIUM SERPL-SCNC: 143 MMOL/L (ref 136–145)
TRIGL SERPL-MCNC: 213 MG/DL (ref 30–150)

## 2021-01-04 PROCEDURE — 80048 BASIC METABOLIC PNL TOTAL CA: CPT

## 2021-01-04 PROCEDURE — 36415 COLL VENOUS BLD VENIPUNCTURE: CPT

## 2021-01-04 PROCEDURE — 80061 LIPID PANEL: CPT

## 2021-04-09 ENCOUNTER — IMMUNIZATION (OUTPATIENT)
Dept: PHARMACY | Facility: CLINIC | Age: 56
End: 2021-04-09

## 2021-04-09 DIAGNOSIS — Z23 NEED FOR VACCINATION: Primary | ICD-10-CM

## 2021-04-30 ENCOUNTER — LAB VISIT (OUTPATIENT)
Dept: LAB | Facility: HOSPITAL | Age: 56
End: 2021-04-30
Attending: INTERNAL MEDICINE
Payer: COMMERCIAL

## 2021-04-30 DIAGNOSIS — E78.00 HYPERCHOLESTEREMIA: ICD-10-CM

## 2021-04-30 DIAGNOSIS — E28.39 PREMATURE OVARIAN FAILURE: ICD-10-CM

## 2021-04-30 DIAGNOSIS — I10 ESSENTIAL HYPERTENSION, MALIGNANT: Primary | ICD-10-CM

## 2021-04-30 DIAGNOSIS — R68.2 DRY MOUTH: ICD-10-CM

## 2021-04-30 LAB
ANION GAP SERPL CALC-SCNC: 10 MMOL/L (ref 8–16)
BUN SERPL-MCNC: 8 MG/DL (ref 6–20)
CALCIUM SERPL-MCNC: 9.4 MG/DL (ref 8.7–10.5)
CHLORIDE SERPL-SCNC: 110 MMOL/L (ref 95–110)
CHOLEST SERPL-MCNC: 207 MG/DL (ref 120–199)
CHOLEST/HDLC SERPL: 3.5 {RATIO} (ref 2–5)
CO2 SERPL-SCNC: 21 MMOL/L (ref 23–29)
CREAT SERPL-MCNC: 0.7 MG/DL (ref 0.5–1.4)
EST. GFR  (AFRICAN AMERICAN): >60 ML/MIN/1.73 M^2
EST. GFR  (NON AFRICAN AMERICAN): >60 ML/MIN/1.73 M^2
ESTRADIOL SERPL-MCNC: 62 PG/ML
GLUCOSE SERPL-MCNC: 91 MG/DL (ref 70–110)
HDLC SERPL-MCNC: 59 MG/DL (ref 40–75)
HDLC SERPL: 28.5 % (ref 20–50)
LDLC SERPL CALC-MCNC: 129.2 MG/DL (ref 63–159)
NONHDLC SERPL-MCNC: 148 MG/DL
POTASSIUM SERPL-SCNC: 3.9 MMOL/L (ref 3.5–5.1)
SODIUM SERPL-SCNC: 141 MMOL/L (ref 136–145)
TRIGL SERPL-MCNC: 94 MG/DL (ref 30–150)
URATE SERPL-MCNC: 4.8 MG/DL (ref 2.4–5.7)

## 2021-04-30 PROCEDURE — 86235 NUCLEAR ANTIGEN ANTIBODY: CPT | Performed by: INTERNAL MEDICINE

## 2021-04-30 PROCEDURE — 80048 BASIC METABOLIC PNL TOTAL CA: CPT | Performed by: INTERNAL MEDICINE

## 2021-04-30 PROCEDURE — 86235 NUCLEAR ANTIGEN ANTIBODY: CPT | Mod: 59 | Performed by: INTERNAL MEDICINE

## 2021-04-30 PROCEDURE — 86038 ANTINUCLEAR ANTIBODIES: CPT | Performed by: INTERNAL MEDICINE

## 2021-04-30 PROCEDURE — 84550 ASSAY OF BLOOD/URIC ACID: CPT | Performed by: INTERNAL MEDICINE

## 2021-04-30 PROCEDURE — 80061 LIPID PANEL: CPT | Performed by: INTERNAL MEDICINE

## 2021-04-30 PROCEDURE — 36415 COLL VENOUS BLD VENIPUNCTURE: CPT | Performed by: INTERNAL MEDICINE

## 2021-04-30 PROCEDURE — 82670 ASSAY OF TOTAL ESTRADIOL: CPT | Performed by: INTERNAL MEDICINE

## 2021-05-03 LAB
ANA SER QL IF: NORMAL
ANTI-SSA ANTIBODY: 0.1 RATIO (ref 0–0.99)
ANTI-SSA INTERPRETATION: NEGATIVE
ANTI-SSB ANTIBODY: 0.06 RATIO (ref 0–0.99)
ANTI-SSB INTERPRETATION: NEGATIVE

## 2021-05-07 ENCOUNTER — IMMUNIZATION (OUTPATIENT)
Dept: PHARMACY | Facility: CLINIC | Age: 56
End: 2021-05-07

## 2021-05-07 DIAGNOSIS — Z23 NEED FOR VACCINATION: Primary | ICD-10-CM

## 2021-06-12 ENCOUNTER — HOSPITAL ENCOUNTER (OUTPATIENT)
Facility: HOSPITAL | Age: 56
Discharge: HOME | End: 2021-06-13
Attending: EMERGENCY MEDICINE | Admitting: STUDENT IN AN ORGANIZED HEALTH CARE EDUCATION/TRAINING PROGRAM
Payer: MEDICAID

## 2021-06-12 DIAGNOSIS — R55 PRE-SYNCOPE: Primary | ICD-10-CM

## 2021-06-12 DIAGNOSIS — R94.31 ABNORMAL EKG: ICD-10-CM

## 2021-06-12 DIAGNOSIS — R07.9 CHEST PAIN: ICD-10-CM

## 2021-06-12 PROCEDURE — 99285 EMERGENCY DEPT VISIT HI MDM: CPT | Mod: 25

## 2021-06-12 PROCEDURE — 96360 HYDRATION IV INFUSION INIT: CPT

## 2021-06-13 VITALS
WEIGHT: 163.13 LBS | HEIGHT: 60 IN | DIASTOLIC BLOOD PRESSURE: 70 MMHG | BODY MASS INDEX: 32.02 KG/M2 | HEART RATE: 80 BPM | SYSTOLIC BLOOD PRESSURE: 141 MMHG | RESPIRATION RATE: 17 BRPM | TEMPERATURE: 98 F | OXYGEN SATURATION: 95 %

## 2021-06-13 PROBLEM — R07.9 CHEST PAIN: Status: ACTIVE | Noted: 2021-06-13

## 2021-06-13 PROBLEM — R55 PRE-SYNCOPE: Status: ACTIVE | Noted: 2021-06-13

## 2021-06-13 PROBLEM — I10 ESSENTIAL HYPERTENSION: Status: ACTIVE | Noted: 2021-06-13

## 2021-06-13 LAB
ALBUMIN SERPL BCP-MCNC: 3.2 G/DL (ref 3.5–5.2)
ALP SERPL-CCNC: 71 U/L (ref 55–135)
ALT SERPL W/O P-5'-P-CCNC: 14 U/L (ref 10–44)
ANION GAP SERPL CALC-SCNC: 12 MMOL/L (ref 8–16)
ANION GAP SERPL CALC-SCNC: 7 MMOL/L (ref 8–16)
AST SERPL-CCNC: 18 U/L (ref 10–40)
BASOPHILS # BLD AUTO: 0.04 K/UL (ref 0–0.2)
BASOPHILS # BLD AUTO: 0.04 K/UL (ref 0–0.2)
BASOPHILS NFR BLD: 0.4 % (ref 0–1.9)
BASOPHILS NFR BLD: 0.4 % (ref 0–1.9)
BILIRUB SERPL-MCNC: 0.3 MG/DL (ref 0.1–1)
BUN SERPL-MCNC: 8 MG/DL (ref 6–20)
BUN SERPL-MCNC: 9 MG/DL (ref 6–20)
CALCIUM SERPL-MCNC: 8.3 MG/DL (ref 8.7–10.5)
CALCIUM SERPL-MCNC: 8.5 MG/DL (ref 8.7–10.5)
CHLORIDE SERPL-SCNC: 106 MMOL/L (ref 95–110)
CHLORIDE SERPL-SCNC: 111 MMOL/L (ref 95–110)
CO2 SERPL-SCNC: 20 MMOL/L (ref 23–29)
CO2 SERPL-SCNC: 23 MMOL/L (ref 23–29)
CREAT SERPL-MCNC: 0.7 MG/DL (ref 0.5–1.4)
CREAT SERPL-MCNC: 0.7 MG/DL (ref 0.5–1.4)
DIFFERENTIAL METHOD: ABNORMAL
DIFFERENTIAL METHOD: NORMAL
EOSINOPHIL # BLD AUTO: 0.3 K/UL (ref 0–0.5)
EOSINOPHIL # BLD AUTO: 0.3 K/UL (ref 0–0.5)
EOSINOPHIL NFR BLD: 3.1 % (ref 0–8)
EOSINOPHIL NFR BLD: 3.3 % (ref 0–8)
ERYTHROCYTE [DISTWIDTH] IN BLOOD BY AUTOMATED COUNT: 12.1 % (ref 11.5–14.5)
ERYTHROCYTE [DISTWIDTH] IN BLOOD BY AUTOMATED COUNT: 12.3 % (ref 11.5–14.5)
EST. GFR  (AFRICAN AMERICAN): >60 ML/MIN/1.73 M^2
EST. GFR  (AFRICAN AMERICAN): >60 ML/MIN/1.73 M^2
EST. GFR  (NON AFRICAN AMERICAN): >60 ML/MIN/1.73 M^2
EST. GFR  (NON AFRICAN AMERICAN): >60 ML/MIN/1.73 M^2
GLUCOSE SERPL-MCNC: 102 MG/DL (ref 70–110)
GLUCOSE SERPL-MCNC: 113 MG/DL (ref 70–110)
HCT VFR BLD AUTO: 38.2 % (ref 37–48.5)
HCT VFR BLD AUTO: 38.4 % (ref 37–48.5)
HGB BLD-MCNC: 12.2 G/DL (ref 12–16)
HGB BLD-MCNC: 12.3 G/DL (ref 12–16)
IMM GRANULOCYTES # BLD AUTO: 0.02 K/UL (ref 0–0.04)
IMM GRANULOCYTES # BLD AUTO: 0.02 K/UL (ref 0–0.04)
IMM GRANULOCYTES NFR BLD AUTO: 0.2 % (ref 0–0.5)
IMM GRANULOCYTES NFR BLD AUTO: 0.2 % (ref 0–0.5)
LYMPHOCYTES # BLD AUTO: 3.4 K/UL (ref 1–4.8)
LYMPHOCYTES # BLD AUTO: 4 K/UL (ref 1–4.8)
LYMPHOCYTES NFR BLD: 36.1 % (ref 18–48)
LYMPHOCYTES NFR BLD: 43.4 % (ref 18–48)
MCH RBC QN AUTO: 27.5 PG (ref 27–31)
MCH RBC QN AUTO: 28 PG (ref 27–31)
MCHC RBC AUTO-ENTMCNC: 31.9 G/DL (ref 32–36)
MCHC RBC AUTO-ENTMCNC: 32 G/DL (ref 32–36)
MCV RBC AUTO: 86 FL (ref 82–98)
MCV RBC AUTO: 88 FL (ref 82–98)
MONOCYTES # BLD AUTO: 0.7 K/UL (ref 0.3–1)
MONOCYTES # BLD AUTO: 0.7 K/UL (ref 0.3–1)
MONOCYTES NFR BLD: 7.3 % (ref 4–15)
MONOCYTES NFR BLD: 7.7 % (ref 4–15)
NEUTROPHILS # BLD AUTO: 4.2 K/UL (ref 1.8–7.7)
NEUTROPHILS # BLD AUTO: 5 K/UL (ref 1.8–7.7)
NEUTROPHILS NFR BLD: 45.4 % (ref 38–73)
NEUTROPHILS NFR BLD: 52.5 % (ref 38–73)
NRBC BLD-RTO: 0 /100 WBC
NRBC BLD-RTO: 0 /100 WBC
PLATELET # BLD AUTO: 279 K/UL (ref 150–450)
PLATELET # BLD AUTO: 303 K/UL (ref 150–450)
PMV BLD AUTO: 9.3 FL (ref 9.2–12.9)
PMV BLD AUTO: 9.6 FL (ref 9.2–12.9)
POTASSIUM SERPL-SCNC: 3.9 MMOL/L (ref 3.5–5.1)
POTASSIUM SERPL-SCNC: 4.3 MMOL/L (ref 3.5–5.1)
PROT SERPL-MCNC: 5.9 G/DL (ref 6–8.4)
RBC # BLD AUTO: 4.39 M/UL (ref 4–5.4)
RBC # BLD AUTO: 4.43 M/UL (ref 4–5.4)
SARS-COV-2 RDRP RESP QL NAA+PROBE: NEGATIVE
SODIUM SERPL-SCNC: 138 MMOL/L (ref 136–145)
SODIUM SERPL-SCNC: 141 MMOL/L (ref 136–145)
TROPONIN I SERPL DL<=0.01 NG/ML-MCNC: <0.006 NG/ML (ref 0–0.03)
WBC # BLD AUTO: 9.27 K/UL (ref 3.9–12.7)
WBC # BLD AUTO: 9.44 K/UL (ref 3.9–12.7)

## 2021-06-13 PROCEDURE — 84484 ASSAY OF TROPONIN QUANT: CPT | Mod: 91 | Performed by: NURSE PRACTITIONER

## 2021-06-13 PROCEDURE — 85025 COMPLETE CBC W/AUTO DIFF WBC: CPT | Mod: 91 | Performed by: NURSE PRACTITIONER

## 2021-06-13 PROCEDURE — 25000003 PHARM REV CODE 250: Performed by: EMERGENCY MEDICINE

## 2021-06-13 PROCEDURE — U0002 COVID-19 LAB TEST NON-CDC: HCPCS | Performed by: EMERGENCY MEDICINE

## 2021-06-13 PROCEDURE — 36415 COLL VENOUS BLD VENIPUNCTURE: CPT | Performed by: EMERGENCY MEDICINE

## 2021-06-13 PROCEDURE — 99900035 HC TECH TIME PER 15 MIN (STAT)

## 2021-06-13 PROCEDURE — 25000003 PHARM REV CODE 250: Performed by: NURSE PRACTITIONER

## 2021-06-13 PROCEDURE — G0378 HOSPITAL OBSERVATION PER HR: HCPCS

## 2021-06-13 PROCEDURE — 80048 BASIC METABOLIC PNL TOTAL CA: CPT | Performed by: EMERGENCY MEDICINE

## 2021-06-13 PROCEDURE — 93005 ELECTROCARDIOGRAM TRACING: CPT

## 2021-06-13 PROCEDURE — 93010 EKG 12-LEAD: ICD-10-PCS | Mod: ,,, | Performed by: INTERNAL MEDICINE

## 2021-06-13 PROCEDURE — 80053 COMPREHEN METABOLIC PANEL: CPT | Performed by: NURSE PRACTITIONER

## 2021-06-13 PROCEDURE — 94761 N-INVAS EAR/PLS OXIMETRY MLT: CPT

## 2021-06-13 PROCEDURE — 84484 ASSAY OF TROPONIN QUANT: CPT | Performed by: EMERGENCY MEDICINE

## 2021-06-13 PROCEDURE — 93010 ELECTROCARDIOGRAM REPORT: CPT | Mod: 59,76,, | Performed by: INTERNAL MEDICINE

## 2021-06-13 PROCEDURE — 36415 COLL VENOUS BLD VENIPUNCTURE: CPT | Performed by: NURSE PRACTITIONER

## 2021-06-13 PROCEDURE — 93010 ELECTROCARDIOGRAM REPORT: CPT | Mod: ,,, | Performed by: INTERNAL MEDICINE

## 2021-06-13 PROCEDURE — 94760 N-INVAS EAR/PLS OXIMETRY 1: CPT

## 2021-06-13 PROCEDURE — 85025 COMPLETE CBC W/AUTO DIFF WBC: CPT | Performed by: EMERGENCY MEDICINE

## 2021-06-13 RX ORDER — ACETAMINOPHEN 325 MG/1
650 TABLET ORAL EVERY 6 HOURS PRN
Status: DISCONTINUED | OUTPATIENT
Start: 2021-06-13 | End: 2021-06-13 | Stop reason: HOSPADM

## 2021-06-13 RX ORDER — SODIUM CHLORIDE 9 MG/ML
INJECTION, SOLUTION INTRAVENOUS CONTINUOUS
Status: DISCONTINUED | OUTPATIENT
Start: 2021-06-13 | End: 2021-06-13 | Stop reason: HOSPADM

## 2021-06-13 RX ORDER — LANOLIN ALCOHOL/MO/W.PET/CERES
800 CREAM (GRAM) TOPICAL
Status: DISCONTINUED | OUTPATIENT
Start: 2021-06-13 | End: 2021-06-13 | Stop reason: HOSPADM

## 2021-06-13 RX ORDER — SODIUM CHLORIDE 0.9 % (FLUSH) 0.9 %
10 SYRINGE (ML) INJECTION
Status: DISCONTINUED | OUTPATIENT
Start: 2021-06-13 | End: 2021-06-13 | Stop reason: HOSPADM

## 2021-06-13 RX ORDER — LISINOPRIL 10 MG/1
20 TABLET ORAL DAILY
Status: DISCONTINUED | OUTPATIENT
Start: 2021-06-13 | End: 2021-06-13 | Stop reason: HOSPADM

## 2021-06-13 RX ORDER — IBUPROFEN 200 MG
16 TABLET ORAL
Status: DISCONTINUED | OUTPATIENT
Start: 2021-06-13 | End: 2021-06-13 | Stop reason: HOSPADM

## 2021-06-13 RX ORDER — IBUPROFEN 200 MG
24 TABLET ORAL
Status: DISCONTINUED | OUTPATIENT
Start: 2021-06-13 | End: 2021-06-13 | Stop reason: HOSPADM

## 2021-06-13 RX ORDER — GLUCAGON 1 MG
1 KIT INJECTION
Status: DISCONTINUED | OUTPATIENT
Start: 2021-06-13 | End: 2021-06-13 | Stop reason: HOSPADM

## 2021-06-13 RX ORDER — ONDANSETRON 2 MG/ML
4 INJECTION INTRAMUSCULAR; INTRAVENOUS EVERY 6 HOURS PRN
Status: DISCONTINUED | OUTPATIENT
Start: 2021-06-13 | End: 2021-06-13 | Stop reason: HOSPADM

## 2021-06-13 RX ADMIN — LISINOPRIL 20 MG: 10 TABLET ORAL at 07:06

## 2021-06-13 RX ADMIN — SODIUM CHLORIDE 1000 ML: 0.9 INJECTION, SOLUTION INTRAVENOUS at 12:06

## 2021-06-13 RX ADMIN — SODIUM CHLORIDE: 0.9 INJECTION, SOLUTION INTRAVENOUS at 06:06

## 2021-06-15 ENCOUNTER — TELEPHONE (OUTPATIENT)
Dept: MEDSURG UNIT | Facility: HOSPITAL | Age: 56
End: 2021-06-15

## 2021-12-15 ENCOUNTER — HOSPITAL ENCOUNTER (EMERGENCY)
Facility: HOSPITAL | Age: 56
Discharge: HOME | End: 2021-12-15
Attending: EMERGENCY MEDICINE
Payer: MEDICAID

## 2021-12-15 DIAGNOSIS — R05.9 COUGH: ICD-10-CM

## 2021-12-15 DIAGNOSIS — J10.1 INFLUENZA A: Primary | ICD-10-CM

## 2021-12-15 LAB
CTP QC/QA: YES
CTP QC/QA: YES
POC MOLECULAR INFLUENZA A AGN: POSITIVE
POC MOLECULAR INFLUENZA B AGN: NEGATIVE
SARS-COV-2 RDRP RESP QL NAA+PROBE: NEGATIVE

## 2021-12-15 PROCEDURE — U0002 COVID-19 LAB TEST NON-CDC: HCPCS | Performed by: PHYSICIAN ASSISTANT

## 2021-12-15 PROCEDURE — 99284 EMERGENCY DEPT VISIT MOD MDM: CPT | Mod: 25

## 2021-12-15 RX ORDER — IBUPROFEN 600 MG/1
600 TABLET ORAL EVERY 6 HOURS PRN
Qty: 30 TABLET | Refills: 0 | Status: SHIPPED | OUTPATIENT
Start: 2021-12-15

## 2021-12-15 RX ORDER — GUAIFENESIN/DEXTROMETHORPHAN 100-10MG/5
5 SYRUP ORAL 4 TIMES DAILY PRN
Qty: 120 ML | Refills: 0 | Status: SHIPPED | OUTPATIENT
Start: 2021-12-15 | End: 2021-12-25

## 2021-12-15 RX ORDER — ONDANSETRON 4 MG/1
4 TABLET, ORALLY DISINTEGRATING ORAL EVERY 6 HOURS PRN
Qty: 20 TABLET | Refills: 0 | Status: SHIPPED | OUTPATIENT
Start: 2021-12-15

## 2021-12-16 VITALS
BODY MASS INDEX: 29.49 KG/M2 | SYSTOLIC BLOOD PRESSURE: 144 MMHG | RESPIRATION RATE: 20 BRPM | HEART RATE: 88 BPM | DIASTOLIC BLOOD PRESSURE: 85 MMHG | WEIGHT: 151 LBS | TEMPERATURE: 99 F | OXYGEN SATURATION: 97 %

## 2022-11-17 ENCOUNTER — HOSPITAL ENCOUNTER (OUTPATIENT)
Dept: RADIOLOGY | Facility: HOSPITAL | Age: 57
Discharge: HOME | End: 2022-11-17
Attending: INTERNAL MEDICINE
Payer: MEDICAID

## 2022-11-17 DIAGNOSIS — J18.9 PNEUMONIA DUE TO INFECTIOUS ORGANISM, UNSPECIFIED LATERALITY, UNSPECIFIED PART OF LUNG: Primary | ICD-10-CM

## 2022-11-17 DIAGNOSIS — J18.9 PNEUMONIA DUE TO INFECTIOUS ORGANISM, UNSPECIFIED LATERALITY, UNSPECIFIED PART OF LUNG: ICD-10-CM

## 2022-11-17 PROCEDURE — 71046 XR CHEST PA AND LATERAL: ICD-10-PCS | Mod: 26,,, | Performed by: RADIOLOGY

## 2022-11-17 PROCEDURE — 71046 X-RAY EXAM CHEST 2 VIEWS: CPT | Mod: 26,,, | Performed by: RADIOLOGY

## 2022-11-17 PROCEDURE — 71046 X-RAY EXAM CHEST 2 VIEWS: CPT | Mod: TC

## 2023-05-19 ENCOUNTER — HOSPITAL ENCOUNTER (OUTPATIENT)
Dept: RADIOLOGY | Facility: HOSPITAL | Age: 58
Discharge: HOME | End: 2023-05-19
Attending: INTERNAL MEDICINE
Payer: MEDICAID

## 2023-05-19 DIAGNOSIS — M25.571 PAIN IN RIGHT ANKLE AND JOINTS OF RIGHT FOOT: Primary | ICD-10-CM

## 2023-05-19 DIAGNOSIS — M13.849 OTHER SPECIFIED ARTHRITIS, UNSPECIFIED HAND: ICD-10-CM

## 2023-05-19 DIAGNOSIS — M13.849 OTHER SPECIFIED ARTHRITIS, UNSPECIFIED HAND: Primary | ICD-10-CM

## 2023-05-19 DIAGNOSIS — M25.571 PAIN IN RIGHT ANKLE AND JOINTS OF RIGHT FOOT: ICD-10-CM

## 2023-05-19 PROCEDURE — 73130 XR HAND COMPLETE 3 VIEWS BILATERAL: ICD-10-PCS | Mod: 26,50,, | Performed by: RADIOLOGY

## 2023-05-19 PROCEDURE — 73610 X-RAY EXAM OF ANKLE: CPT | Mod: 26,RT,, | Performed by: RADIOLOGY

## 2023-05-19 PROCEDURE — 73610 X-RAY EXAM OF ANKLE: CPT | Mod: TC,FY,RT

## 2023-05-19 PROCEDURE — 73130 X-RAY EXAM OF HAND: CPT | Mod: TC,50,FY

## 2023-05-19 PROCEDURE — 73610 XR ANKLE COMPLETE 3 VIEW RIGHT: ICD-10-PCS | Mod: 26,RT,, | Performed by: RADIOLOGY

## 2023-05-19 PROCEDURE — 73130 X-RAY EXAM OF HAND: CPT | Mod: 26,50,, | Performed by: RADIOLOGY

## 2023-06-08 ENCOUNTER — LAB VISIT (OUTPATIENT)
Dept: LAB | Facility: HOSPITAL | Age: 58
End: 2023-06-08
Attending: INTERNAL MEDICINE
Payer: MEDICAID

## 2023-06-08 DIAGNOSIS — E28.39 RESISTANT OVARY SYNDROME: ICD-10-CM

## 2023-06-08 DIAGNOSIS — M13.849: ICD-10-CM

## 2023-06-08 DIAGNOSIS — I10 ESSENTIAL HYPERTENSION, MALIGNANT: Primary | ICD-10-CM

## 2023-06-08 LAB
ALBUMIN SERPL BCP-MCNC: 3.8 G/DL (ref 3.5–5.2)
ALP SERPL-CCNC: 69 U/L (ref 55–135)
ALT SERPL W/O P-5'-P-CCNC: 13 U/L (ref 10–44)
ANION GAP SERPL CALC-SCNC: 9 MMOL/L (ref 8–16)
AST SERPL-CCNC: 17 U/L (ref 10–40)
BACTERIA #/AREA URNS HPF: ABNORMAL /HPF
BASOPHILS # BLD AUTO: 0.04 K/UL (ref 0–0.2)
BASOPHILS NFR BLD: 0.6 % (ref 0–1.9)
BILIRUB SERPL-MCNC: 0.4 MG/DL (ref 0.1–1)
BILIRUB UR QL STRIP: NEGATIVE
BUN SERPL-MCNC: 12 MG/DL (ref 6–20)
CALCIUM SERPL-MCNC: 9.2 MG/DL (ref 8.7–10.5)
CCP AB SER IA-ACNC: <0.5 U/ML
CHLORIDE SERPL-SCNC: 111 MMOL/L (ref 95–110)
CHOLEST SERPL-MCNC: 194 MG/DL (ref 120–199)
CHOLEST/HDLC SERPL: 3.2 {RATIO} (ref 2–5)
CLARITY UR: CLEAR
CO2 SERPL-SCNC: 22 MMOL/L (ref 23–29)
COLOR UR: YELLOW
CREAT SERPL-MCNC: 0.8 MG/DL (ref 0.5–1.4)
DIFFERENTIAL METHOD: NORMAL
EOSINOPHIL # BLD AUTO: 0.3 K/UL (ref 0–0.5)
EOSINOPHIL NFR BLD: 4.6 % (ref 0–8)
ERYTHROCYTE [DISTWIDTH] IN BLOOD BY AUTOMATED COUNT: 11.9 % (ref 11.5–14.5)
ERYTHROCYTE [SEDIMENTATION RATE] IN BLOOD BY PHOTOMETRIC METHOD: 13 MM/HR (ref 0–36)
EST. GFR  (NO RACE VARIABLE): >60 ML/MIN/1.73 M^2
ESTIMATED AVG GLUCOSE: 108 MG/DL (ref 68–131)
GLUCOSE SERPL-MCNC: 97 MG/DL (ref 70–110)
GLUCOSE UR QL STRIP: NEGATIVE
HBA1C MFR BLD: 5.4 % (ref 4–5.6)
HCT VFR BLD AUTO: 41.8 % (ref 37–48.5)
HDLC SERPL-MCNC: 60 MG/DL (ref 40–75)
HDLC SERPL: 30.9 % (ref 20–50)
HGB BLD-MCNC: 13.7 G/DL (ref 12–16)
HGB UR QL STRIP: ABNORMAL
IMM GRANULOCYTES # BLD AUTO: 0 K/UL (ref 0–0.04)
IMM GRANULOCYTES NFR BLD AUTO: 0 % (ref 0–0.5)
KETONES UR QL STRIP: NEGATIVE
LDLC SERPL CALC-MCNC: 120.6 MG/DL (ref 63–159)
LEUKOCYTE ESTERASE UR QL STRIP: ABNORMAL
LYMPHOCYTES # BLD AUTO: 2.7 K/UL (ref 1–4.8)
LYMPHOCYTES NFR BLD: 42 % (ref 18–48)
MCH RBC QN AUTO: 27.7 PG (ref 27–31)
MCHC RBC AUTO-ENTMCNC: 32.8 G/DL (ref 32–36)
MCV RBC AUTO: 84 FL (ref 82–98)
MICROSCOPIC COMMENT: ABNORMAL
MONOCYTES # BLD AUTO: 0.5 K/UL (ref 0.3–1)
MONOCYTES NFR BLD: 7.1 % (ref 4–15)
NEUTROPHILS # BLD AUTO: 2.9 K/UL (ref 1.8–7.7)
NEUTROPHILS NFR BLD: 45.7 % (ref 38–73)
NITRITE UR QL STRIP: NEGATIVE
NONHDLC SERPL-MCNC: 134 MG/DL
NRBC BLD-RTO: 0 /100 WBC
PH UR STRIP: 6 [PH] (ref 5–8)
PLATELET # BLD AUTO: 265 K/UL (ref 150–450)
PMV BLD AUTO: 10 FL (ref 9.2–12.9)
POTASSIUM SERPL-SCNC: 4.1 MMOL/L (ref 3.5–5.1)
PROT SERPL-MCNC: 7.3 G/DL (ref 6–8.4)
PROT UR QL STRIP: NEGATIVE
RBC # BLD AUTO: 4.95 M/UL (ref 4–5.4)
RBC #/AREA URNS HPF: 3 /HPF (ref 0–4)
RHEUMATOID FACT SERPL-ACNC: <13 IU/ML (ref 0–15)
SODIUM SERPL-SCNC: 142 MMOL/L (ref 136–145)
SP GR UR STRIP: 1.01 (ref 1–1.03)
SQUAMOUS #/AREA URNS HPF: 6 /HPF
TRIGL SERPL-MCNC: 67 MG/DL (ref 30–150)
TSH SERPL DL<=0.005 MIU/L-ACNC: 2.23 UIU/ML (ref 0.4–4)
URN SPEC COLLECT METH UR: ABNORMAL
UROBILINOGEN UR STRIP-ACNC: NEGATIVE EU/DL
WBC # BLD AUTO: 6.35 K/UL (ref 3.9–12.7)
WBC #/AREA URNS HPF: 16 /HPF (ref 0–5)

## 2023-06-08 PROCEDURE — 86038 ANTINUCLEAR ANTIBODIES: CPT | Performed by: INTERNAL MEDICINE

## 2023-06-08 PROCEDURE — 85652 RBC SED RATE AUTOMATED: CPT | Performed by: INTERNAL MEDICINE

## 2023-06-08 PROCEDURE — 83036 HEMOGLOBIN GLYCOSYLATED A1C: CPT | Performed by: INTERNAL MEDICINE

## 2023-06-08 PROCEDURE — 81000 URINALYSIS NONAUTO W/SCOPE: CPT | Performed by: INTERNAL MEDICINE

## 2023-06-08 PROCEDURE — 80061 LIPID PANEL: CPT | Performed by: INTERNAL MEDICINE

## 2023-06-08 PROCEDURE — 80053 COMPREHEN METABOLIC PANEL: CPT | Performed by: INTERNAL MEDICINE

## 2023-06-08 PROCEDURE — 36415 COLL VENOUS BLD VENIPUNCTURE: CPT | Performed by: INTERNAL MEDICINE

## 2023-06-08 PROCEDURE — 84443 ASSAY THYROID STIM HORMONE: CPT | Performed by: INTERNAL MEDICINE

## 2023-06-08 PROCEDURE — 86235 NUCLEAR ANTIGEN ANTIBODY: CPT | Performed by: INTERNAL MEDICINE

## 2023-06-08 PROCEDURE — 86235 NUCLEAR ANTIGEN ANTIBODY: CPT | Mod: 59 | Performed by: INTERNAL MEDICINE

## 2023-06-08 PROCEDURE — 85025 COMPLETE CBC W/AUTO DIFF WBC: CPT | Performed by: INTERNAL MEDICINE

## 2023-06-08 PROCEDURE — 86431 RHEUMATOID FACTOR QUANT: CPT | Performed by: INTERNAL MEDICINE

## 2023-06-08 PROCEDURE — 86200 CCP ANTIBODY: CPT | Performed by: INTERNAL MEDICINE

## 2023-06-09 LAB — ANA SER QL IF: NORMAL

## 2023-06-12 LAB
ANTI-SSA ANTIBODY: 0.1 RATIO (ref 0–0.99)
ANTI-SSA INTERPRETATION: NEGATIVE

## 2023-06-13 LAB — ENA SCL70 AB SER-ACNC: <0.6 U/ML

## 2024-10-10 ENCOUNTER — HOSPITAL ENCOUNTER (INPATIENT)
Facility: HOSPITAL | Age: 59
LOS: 2 days | Discharge: HOME | DRG: 062 | End: 2024-10-12
Attending: EMERGENCY MEDICINE | Admitting: INTERNAL MEDICINE
Payer: MEDICAID

## 2024-10-10 DIAGNOSIS — I63.531 ACUTE ISCHEMIC RIGHT PCA STROKE: ICD-10-CM

## 2024-10-10 DIAGNOSIS — I63.89 OTHER CEREBRAL INFARCTION: ICD-10-CM

## 2024-10-10 DIAGNOSIS — I63.9 ACUTE CVA (CEREBROVASCULAR ACCIDENT): ICD-10-CM

## 2024-10-10 DIAGNOSIS — R29.898 WEAKNESS OF LEFT UPPER EXTREMITY: ICD-10-CM

## 2024-10-10 DIAGNOSIS — R29.818 ACUTE FOCAL NEUROLOGICAL DEFICIT: Primary | ICD-10-CM

## 2024-10-10 DIAGNOSIS — I10 ESSENTIAL HYPERTENSION: ICD-10-CM

## 2024-10-10 PROBLEM — I63.511 ACUTE ISCHEMIC RIGHT MCA STROKE: Status: ACTIVE | Noted: 2024-10-10

## 2024-10-10 LAB
ALBUMIN SERPL BCP-MCNC: 3.4 G/DL (ref 3.5–5.2)
ALP SERPL-CCNC: 63 U/L (ref 55–135)
ALT SERPL W/O P-5'-P-CCNC: 13 U/L (ref 10–44)
ANION GAP SERPL CALC-SCNC: 6 MMOL/L (ref 8–16)
AST SERPL-CCNC: 15 U/L (ref 10–40)
BASOPHILS # BLD AUTO: 0.04 K/UL (ref 0–0.2)
BASOPHILS NFR BLD: 0.6 % (ref 0–1.9)
BILIRUB SERPL-MCNC: 0.4 MG/DL (ref 0.1–1)
BUN SERPL-MCNC: 12 MG/DL (ref 6–20)
CALCIUM SERPL-MCNC: 8.8 MG/DL (ref 8.7–10.5)
CHLORIDE SERPL-SCNC: 106 MMOL/L (ref 95–110)
CHOLEST SERPL-MCNC: 194 MG/DL (ref 120–199)
CHOLEST/HDLC SERPL: 3.4 {RATIO} (ref 2–5)
CO2 SERPL-SCNC: 24 MMOL/L (ref 23–29)
CREAT SERPL-MCNC: 0.8 MG/DL (ref 0.5–1.4)
CREAT SERPL-MCNC: 0.9 MG/DL (ref 0.5–1.4)
DIFFERENTIAL METHOD BLD: NORMAL
EOSINOPHIL # BLD AUTO: 0.2 K/UL (ref 0–0.5)
EOSINOPHIL NFR BLD: 2.8 % (ref 0–8)
ERYTHROCYTE [DISTWIDTH] IN BLOOD BY AUTOMATED COUNT: 12.1 % (ref 11.5–14.5)
EST. GFR  (NO RACE VARIABLE): >60 ML/MIN/1.73 M^2
GLUCOSE SERPL-MCNC: 84 MG/DL (ref 70–110)
HCT VFR BLD AUTO: 40.4 % (ref 37–48.5)
HDLC SERPL-MCNC: 57 MG/DL (ref 40–75)
HDLC SERPL: 29.4 % (ref 20–50)
HGB BLD-MCNC: 13.3 G/DL (ref 12–16)
IMM GRANULOCYTES # BLD AUTO: 0.01 K/UL (ref 0–0.04)
IMM GRANULOCYTES NFR BLD AUTO: 0.1 % (ref 0–0.5)
INR PPP: 1 (ref 0.8–1.2)
LDLC SERPL CALC-MCNC: 112.4 MG/DL (ref 63–159)
LYMPHOCYTES # BLD AUTO: 2.7 K/UL (ref 1–4.8)
LYMPHOCYTES NFR BLD: 39.1 % (ref 18–48)
MCH RBC QN AUTO: 27.7 PG (ref 27–31)
MCHC RBC AUTO-ENTMCNC: 32.9 G/DL (ref 32–36)
MCV RBC AUTO: 84 FL (ref 82–98)
MONOCYTES # BLD AUTO: 0.6 K/UL (ref 0.3–1)
MONOCYTES NFR BLD: 8.4 % (ref 4–15)
NEUTROPHILS # BLD AUTO: 3.3 K/UL (ref 1.8–7.7)
NEUTROPHILS NFR BLD: 49 % (ref 38–73)
NONHDLC SERPL-MCNC: 137 MG/DL
NRBC BLD-RTO: 0 /100 WBC
PLATELET # BLD AUTO: 245 K/UL (ref 150–450)
PMV BLD AUTO: 9.3 FL (ref 9.2–12.9)
POC PTINR: 1.1 (ref 0.9–1.2)
POCT GLUCOSE: 110 MG/DL (ref 70–110)
POTASSIUM SERPL-SCNC: 3.7 MMOL/L (ref 3.5–5.1)
PROT SERPL-MCNC: 6.4 G/DL (ref 6–8.4)
PROTHROMBIN TIME: 11 SEC (ref 9–12.5)
RBC # BLD AUTO: 4.81 M/UL (ref 4–5.4)
SAMPLE: NORMAL
SAMPLE: NORMAL
SITE: NORMAL
SODIUM SERPL-SCNC: 136 MMOL/L (ref 136–145)
TRIGL SERPL-MCNC: 123 MG/DL (ref 30–150)
TROPONIN I SERPL DL<=0.01 NG/ML-MCNC: <0.006 NG/ML (ref 0–0.03)
TSH SERPL DL<=0.005 MIU/L-ACNC: 1.98 UIU/ML (ref 0.4–4)
WBC # BLD AUTO: 6.81 K/UL (ref 3.9–12.7)

## 2024-10-10 PROCEDURE — 85610 PROTHROMBIN TIME: CPT

## 2024-10-10 PROCEDURE — 63600175 PHARM REV CODE 636 W HCPCS: Mod: JG

## 2024-10-10 PROCEDURE — 63600175 PHARM REV CODE 636 W HCPCS: Performed by: EMERGENCY MEDICINE

## 2024-10-10 PROCEDURE — 3E03317 INTRODUCTION OF OTHER THROMBOLYTIC INTO PERIPHERAL VEIN, PERCUTANEOUS APPROACH: ICD-10-PCS

## 2024-10-10 PROCEDURE — 84484 ASSAY OF TROPONIN QUANT: CPT | Performed by: EMERGENCY MEDICINE

## 2024-10-10 PROCEDURE — 85025 COMPLETE CBC W/AUTO DIFF WBC: CPT | Performed by: EMERGENCY MEDICINE

## 2024-10-10 PROCEDURE — 84443 ASSAY THYROID STIM HORMONE: CPT | Performed by: EMERGENCY MEDICINE

## 2024-10-10 PROCEDURE — 12000002 HC ACUTE/MED SURGE SEMI-PRIVATE ROOM

## 2024-10-10 PROCEDURE — 93005 ELECTROCARDIOGRAM TRACING: CPT

## 2024-10-10 PROCEDURE — 96365 THER/PROPH/DIAG IV INF INIT: CPT

## 2024-10-10 PROCEDURE — 99285 EMERGENCY DEPT VISIT HI MDM: CPT | Mod: 95,,, | Performed by: PSYCHIATRY & NEUROLOGY

## 2024-10-10 PROCEDURE — 80053 COMPREHEN METABOLIC PANEL: CPT | Performed by: EMERGENCY MEDICINE

## 2024-10-10 PROCEDURE — 25500020 PHARM REV CODE 255: Performed by: EMERGENCY MEDICINE

## 2024-10-10 PROCEDURE — 82565 ASSAY OF CREATININE: CPT

## 2024-10-10 PROCEDURE — 93010 ELECTROCARDIOGRAM REPORT: CPT | Mod: ,,, | Performed by: INTERNAL MEDICINE

## 2024-10-10 PROCEDURE — 99900035 HC TECH TIME PER 15 MIN (STAT)

## 2024-10-10 PROCEDURE — 99285 EMERGENCY DEPT VISIT HI MDM: CPT | Mod: 25

## 2024-10-10 PROCEDURE — 80061 LIPID PANEL: CPT | Performed by: EMERGENCY MEDICINE

## 2024-10-10 PROCEDURE — 85610 PROTHROMBIN TIME: CPT | Performed by: EMERGENCY MEDICINE

## 2024-10-10 PROCEDURE — 96375 TX/PRO/DX INJ NEW DRUG ADDON: CPT

## 2024-10-10 RX ORDER — LABETALOL HYDROCHLORIDE 5 MG/ML
10 INJECTION, SOLUTION INTRAVENOUS EVERY 4 HOURS PRN
Status: DISCONTINUED | OUTPATIENT
Start: 2024-10-10 | End: 2024-10-12 | Stop reason: HOSPADM

## 2024-10-10 RX ORDER — NICARDIPINE HYDROCHLORIDE 0.2 MG/ML
INJECTION INTRAVENOUS
Status: DISPENSED
Start: 2024-10-10 | End: 2024-10-11

## 2024-10-10 RX ORDER — SODIUM CHLORIDE 0.9 % (FLUSH) 0.9 %
10 SYRINGE (ML) INJECTION
Status: DISCONTINUED | OUTPATIENT
Start: 2024-10-10 | End: 2024-10-12 | Stop reason: HOSPADM

## 2024-10-10 RX ORDER — BISACODYL 10 MG/1
10 SUPPOSITORY RECTAL DAILY PRN
Status: DISCONTINUED | OUTPATIENT
Start: 2024-10-10 | End: 2024-10-12 | Stop reason: HOSPADM

## 2024-10-10 RX ORDER — ATORVASTATIN CALCIUM 40 MG/1
40 TABLET, FILM COATED ORAL DAILY
Status: DISCONTINUED | OUTPATIENT
Start: 2024-10-11 | End: 2024-10-12 | Stop reason: HOSPADM

## 2024-10-10 RX ORDER — NICARDIPINE HYDROCHLORIDE 0.2 MG/ML
0-15 INJECTION INTRAVENOUS CONTINUOUS
Status: DISCONTINUED | OUTPATIENT
Start: 2024-10-10 | End: 2024-10-11

## 2024-10-10 RX ORDER — MULTIVITAMIN
1 TABLET ORAL DAILY
COMMUNITY

## 2024-10-10 RX ORDER — ONDANSETRON HYDROCHLORIDE 2 MG/ML
4 INJECTION, SOLUTION INTRAVENOUS EVERY 12 HOURS PRN
Status: DISCONTINUED | OUTPATIENT
Start: 2024-10-10 | End: 2024-10-12 | Stop reason: HOSPADM

## 2024-10-10 RX ADMIN — NICARDIPINE HYDROCHLORIDE 5 MG/HR: 0.2 INJECTION, SOLUTION INTRAVENOUS at 02:10

## 2024-10-10 RX ADMIN — Medication 16 MG: at 03:10

## 2024-10-10 RX ADMIN — IOHEXOL 100 ML: 350 INJECTION, SOLUTION INTRAVENOUS at 02:10

## 2024-10-10 NOTE — ED PROVIDER NOTES
Encounter Date: 10/10/2024       History     Chief Complaint   Patient presents with    Numbness     Pt reports left sided facial numbness that radiated down left arm that started at 1000 today. Pt reports symptoms resolved and then returned. Pt took 2 ASA.    Left sided weakness     Patient is a 59-year-old female with a history of hypertension who presents to the ED with left upper extremity weakness and numbness.  Patient reports acute onset of left-sided facial numbness weakness to the left upper extremity that started approximately 10:00 a.m. this morning and resolved.   at bedside states that at approximately 1300 he received a call from his wife saying that the left-sided facial numbness, weakness to the left upper extremity returned again thus prompting him to bring her to the ED for evaluation.      Review of patient's allergies indicates:   Allergen Reactions    Codeine     Hydrocodone-acetaminophen Itching     Insomnia^       Past Medical History:   Diagnosis Date    Glaucoma     Hypertension      Past Surgical History:   Procedure Laterality Date    GLAUCOMA SURGERY       Family History   Family history unknown: Yes     Social History     Tobacco Use    Smoking status: Never    Smokeless tobacco: Never   Substance Use Topics    Alcohol use: Yes     Comment: Social    Drug use: Never     Review of Systems   Constitutional:  Negative for chills and fever.   Respiratory:  Negative for cough, chest tightness and shortness of breath.    Cardiovascular:  Negative for chest pain, palpitations and leg swelling.   Gastrointestinal:  Negative for abdominal pain, diarrhea and vomiting.   Genitourinary:  Negative for dysuria.   Musculoskeletal:  Negative for arthralgias and gait problem.   Neurological:  Positive for weakness and numbness. Negative for dizziness, tremors, seizures, syncope, facial asymmetry, speech difficulty, light-headedness and headaches.   Psychiatric/Behavioral:  Negative for agitation  and confusion.        Physical Exam     Initial Vitals [10/10/24 1422]   BP Pulse Resp Temp SpO2   (!) 180/84 78 18 98.3 °F (36.8 °C) 97 %      MAP       --         Physical Exam    Nursing note and vitals reviewed.  Constitutional: She appears well-developed and well-nourished.   HENT:   Head: Normocephalic and atraumatic.   Right Ear: External ear normal.   Left Ear: External ear normal.   Eyes: EOM are normal. Pupils are equal, round, and reactive to light.   Neck: Neck supple.   Normal range of motion.  Cardiovascular:  Normal rate, regular rhythm, normal heart sounds and intact distal pulses.     Exam reveals no gallop and no friction rub.       No murmur heard.  Pulmonary/Chest: Breath sounds normal.   Abdominal: Abdomen is soft. Bowel sounds are normal.   Musculoskeletal:      Cervical back: Normal range of motion and neck supple.     Neurological: She is alert and oriented to person, place, and time. A sensory deficit is present. GCS score is 15. GCS eye subscore is 4. GCS verbal subscore is 5. GCS motor subscore is 6.   4/5  strength L  4/5 LLE strength  No facial droop  No slurred speech  L pronator drift    Skin: Skin is warm. Capillary refill takes less than 2 seconds.   Psychiatric: She has a normal mood and affect.         ED Course   Critical Care    Date/Time: 10/11/2024 2:19 PM    Performed by: Guille Villarreal MD  Authorized by: Guille Villarreal MD  Direct patient critical care time: 30 minutes  Additional history critical care time: 5 minutes  Ordering / reviewing critical care time: 10 minutes  Documentation critical care time: 10 minutes  Consulting other physicians critical care time: 10 minutes  Consult with family critical care time: 5 minutes  Other critical care time: 5 minutes  Total critical care time (exclusive of procedural time) : 75 minutes  Critical care was necessary to treat or prevent imminent or life-threatening deterioration of the following conditions: CNS failure  or compromise.  Critical care was time spent personally by me on the following activities: discussions with consultants, discussions with primary provider, evaluation of patient's response to treatment, examination of patient, obtaining history from patient or surrogate, ordering and performing treatments and interventions, ordering and review of laboratory studies, ordering and review of radiographic studies, re-evaluation of patient's condition and review of old charts.        Labs Reviewed   COMPREHENSIVE METABOLIC PANEL - Abnormal       Result Value    Sodium 136      Potassium 3.7      Chloride 106      CO2 24      Glucose 84      BUN 12      Creatinine 0.8      Calcium 8.8      Total Protein 6.4      Albumin 3.4 (*)     Total Bilirubin 0.4      Alkaline Phosphatase 63      AST 15      ALT 13      eGFR >60      Anion Gap 6 (*)    CBC W/ AUTO DIFFERENTIAL    WBC 6.81      RBC 4.81      Hemoglobin 13.3      Hematocrit 40.4      MCV 84      MCH 27.7      MCHC 32.9      RDW 12.1      Platelets 245      MPV 9.3      Immature Granulocytes 0.1      Gran # (ANC) 3.3      Immature Grans (Abs) 0.01      Lymph # 2.7      Mono # 0.6      Eos # 0.2      Baso # 0.04      nRBC 0      Gran % 49.0      Lymph % 39.1      Mono % 8.4      Eosinophil % 2.8      Basophil % 0.6      Differential Method Automated     PROTIME-INR    Prothrombin Time 11.0      INR 1.0     TSH    TSH 1.984     LIPID PANEL    Cholesterol 194      Triglycerides 123      HDL 57      LDL Cholesterol 112.4      HDL/Cholesterol Ratio 29.4      Total Cholesterol/HDL Ratio 3.4      Non-HDL Cholesterol 137     TROPONIN I    Troponin I <0.006     POCT GLUCOSE, HAND-HELD DEVICE   POCT GLUCOSE    POCT Glucose 110     ISTAT PROCEDURE    POC PTINR 1.1      Sample VENOUS     ISTAT CREATININE    POC Creatinine 0.9      Sample VENOUS      Site Other            Imaging Results              MRI Thoracic Spine Without Contrast (Final result)  Result time 10/11/24 08:52:35       Final result by Atif Adkins MD (10/11/24 08:52:35)                   Impression:      Nonspecific 8 mm sclerotic focus right T4 vertebral body without encroachment on the spinal canal.  This may reflect a bone island unless otherwise suspected clinically.  Follow-up for stability as clinically warranted.    Mild degenerative changes without advanced stenosis throughout the thoracic spine..      Electronically signed by: Atif Adkins  Date:    10/11/2024  Time:    08:52               Narrative:    EXAMINATION:  MRI THORACIC SPINE WITHOUT CONTRAST    CLINICAL HISTORY:  Bone lesion, thoracic spine, incidental;    TECHNIQUE:  Multiplanar, multisequence images were performed through the thoracic spine.  Contrast was not administered.    COMPARISON:  CTA 10/10/2024    FINDINGS:  The 8 mm sclerotic focus within the right side of the T4 vertebral body is only vaguely identified.  This is nonspecific and may reflect a bone island unless otherwise suspected clinically.  No extension into the spinal canal.    No advanced marrow edema or osseous destructive process.    The thoracic spinal cord maintains normal signal intensity.    Degenerative disc space narrowing within the midthoracic spine with minimal disc bulging but no advanced central or foraminal stenosis.  Facet and ligamentous hypertrophy left greater than right at T3-4 without advanced central stenosis.                                       MRI Brain Without Contrast (Final result)  Result time 10/11/24 02:24:06      Final result by Yohan Hernández MD (10/11/24 02:24:06)                   Impression:      No acute abnormality.    Small lacunar infarct posterior right thalamus. Few scattered supratentorial white matter T2/flair hyperintense signal foci suggesting sequela of chronic small vessel ischemic change.        Electronically signed by: Yohan Hernández MD  Date:    10/11/2024  Time:    02:24               Narrative:    EXAMINATION:  MRI BRAIN  WITHOUT CONTRAST    CLINICAL HISTORY:  Stroke, follow up;.    TECHNIQUE:  Multiplanar multisequence MR imaging of the brain was performed without contrast.    COMPARISON:  None    FINDINGS:  Intracranial compartment:    Ventricles and sulci are normal in size for age without evidence of hydrocephalus. No extra-axial blood or fluid collections.    Small lacunar infarct posterior right thalamus.  Few scattered supratentorial white matter T2/flair hyperintense signal foci suggesting sequela of chronic small vessel ischemic change.      No mass lesion, acute hemorrhage, edema or acute infarct.    Normal vascular flow voids are preserved.    Skull/extracranial contents (limited evaluation): Bone marrow signal intensity is normal.                                       MRA Brain (Final result)  Result time 10/11/24 00:25:14      Final result by Stanton Elliott MD (10/11/24 00:25:14)                   Impression:      The stable vascular distribution of the intracranial circulation with isolate focal high-grade stenosis or hypoplastic segment at the P1 P2 junction on the right.      Electronically signed by: Stanton Elliott  Date:    10/11/2024  Time:    00:25               Narrative:    EXAMINATION:  MRA BRAIN WITHOUT CONTRAST    CLINICAL HISTORY:  Stroke, follow up;    TECHNIQUE:  Non-contrast 3-D time-of-flight intracranial MR angiography was performed through the [place] [facility] [place] with MIP reformatting.    COMPARISON:  CT's brain, 10/10/2024, CTA brain, 10/10/2024    FINDINGS:  There is dual origin of the right PCA with equal components from the basilar tip and the anterior circulation with focal narrowing in the P1/P2 junction segment.  The remainder of the posterior cerebral circulation, the vertebrobasilar system as well as the internal carotid arteries, middle cerebral and anterior cerebral territories demonstrate normal signal and morphology with no focal stenosis or aneurysmal lesion.  Dominant left vertebral  artery.                                       US Lower Extremity Veins Bilateral (Final result)  Result time 10/10/24 20:17:23      Final result by Vilma Elliott MD (10/10/24 20:17:23)                   Impression:      No evidence of deep venous thrombosis in either lower extremity.      Electronically signed by: Vilma Elliott  Date:    10/10/2024  Time:    20:17               Narrative:    EXAMINATION:  US LOWER EXTREMITY VEINS BILATERAL    CLINICAL HISTORY:  Lower ext discomfort, CVA, family hx of DVT;    TECHNIQUE:  Duplex and color flow Doppler and dynamic compression was performed of the bilateral lower extremity veins was performed.    COMPARISON:  None    FINDINGS:  Right thigh veins: The common femoral, femoral, popliteal, upper greater saphenous, and deep femoral veins are patent and free of thrombus. The veins are normally compressible and have normal phasic flow and augmentation response.    Right calf veins: The visualized calf veins are patent.    Left thigh veins: The common femoral, femoral, popliteal, upper greater saphenous, and deep femoral veins are patent and free of thrombus. The veins are normally compressible and have normal phasic flow and augmentation response.    Left calf veins: The visualized calf veins are patent.    Miscellaneous: None                                       CT Head Without Contrast (Final result)  Result time 10/10/24 17:21:49      Final result by Stanton Elliott MD (10/10/24 17:21:49)                   Impression:      No interval developing infarct especially in the distribution of the right PCA territory.    No detrimental change in the insular cortex on the right.    No evidence of hemorrhage post thrombo lytic therapy.    As mentioned before, consider baseline MRI if clinically warranted.      Electronically signed by: Stanton Elliott  Date:    10/10/2024  Time:    17:21               Narrative:    EXAMINATION:  CT HEAD WITHOUT CONTRAST    CLINICAL  HISTORY:  Headache, sudden, severe;Headache post TNK administration.;    TECHNIQUE:  Low dose axial CT images obtained throughout the head without intravenous contrast. Sagittal and coronal reconstructions were performed.    COMPARISON:  10/10/2024 at 14:17 hours    FINDINGS:  Intracranial compartment:    Ventricles and sulci are stable in size for age without evidence of hydrocephalus. No extra-axial blood or fluid collections.    The brain parenchyma appears stable.  No low density in the insular cortex is appreciated on the right.  No parenchymal mass, hemorrhage, edema or major vascular distribution infarct.    Skull/extracranial contents (limited evaluation): No fracture. Mastoid air cells and paranasal sinuses are essentially clear.                                        CTA Head and Neck (xpd) (Final result)  Result time 10/10/24 15:44:31      Final result by Deepak Barros MD (10/10/24 15:44:31)                   Impression:      CT scan of the head:    Subtle loss of the gray-white differentiation in the right subinsular cortex.  MRI of the brain may be obtained for confirmation.    Old lacunar type infarct in the right thalamus.    No evidence of intracranial hemorrhage.    CTA brain:    No large vessel occlusion in the intracranial circulation.    High-grade narrowing the proximal aspect of the right P2.    CTA neck:    No hemodynamically significant stenosis of the neck vessels.    Atherosclerotic plaque at the origin of the left subclavian artery.    8 mm sclerotic lesion in the T4 vertebral body.  Patient follow-up with MRI thoracic spine without and with contrast is suggested.    This report was flagged in Epic as abnormal.      Electronically signed by: Deepak Barros MD  Date:    10/10/2024  Time:    15:44               Narrative:    EXAMINATION:  CTA HEAD AND NECK (XPD)    CLINICAL HISTORY:  Neuro deficit, acute, stroke suspected;    TECHNIQUE:  Non contrast low dose axial images were obtained through  the head.  CT angiogram was performed from the level of the duane to the top of the head following the IV administration of 100mL of Omnipaque 350.   Sagittal and coronal reconstructions and maximum intensity projection reconstructions were performed. Arterial stenosis percentages are based on NASCET measurement criteria.    COMPARISON:  None    FINDINGS:  Noncontrast CT scan of the head:    The subcutaneous tissues are unremarkable.  The bony calvarium is intact.  The paranasal sinuses are unremarkable.  The mastoid air cells are clear.  The orbits and intraorbital contents are within normal limits.    The craniocervical junction is intact.  There is empty appearance of the sella.  There is no evidence intracranial hemorrhage.  There are no extra-axial fluid collections.    There is an old lacunar type infarction in the right thalamus.  There is subtle loss of the gray-white differentiation within the right subinsular cortex.  The remainder of the gray-white differentiation is maintained.  There is no dense vessel sign.  There is no evidence of mass effect.    CTA neck:    The visualized portions of the pulmonary tree are unremarkable.  There are no filling defects.  The heart does not appear enlarged.    There is a normal 3 vessel arch configuration.  There are extensive calcifications within the proximal aspect of the left subclavian artery.  The right subclavian is within normal limits    There are minimal calcifications at the bilateral carotid bulbs.  The right ICA is within normal limits.  The left ICA is unremarkable.    The origins of the right vertebral artery is obscured by streak artifact.  The origin of the left vertebral artery is within normal limits.    The vertebral arteries are normal in caliber.    There is no evidence of hemodynamically significant stenosis of neck vessels.    CTA head:    There are calcifications of the cavernous segments of the ICA.  The A1 segment right anterior cerebral is  slightly hypoplastic.  The remainder of the anterior cerebral arteries and anterior component complex are within normal limits.    The middle cerebral arteries are within normal limits.    There are calcifications involving the V3 and V4 segment of the right vertebral artery.  The left vertebral artery is unremarkable.  The basilar is within normal limits.  There is high-grade narrowing of the proximal aspect of the right P2 segment.  The remainder the posterior cerebral arteries are within normal limits.    The dural venous sinuses are within normal limits.    Additional findings:    The soft tissue the neck are unremarkable.  There is no evidence of lymphadenopathy in the neck.  The trachea is unremarkable.  There is mosaic attenuation of the lung fields.  There is an 8 mm sclerotic lesion in the T4 vertebral body.                                       Medications   sodium chloride 0.9% flush 10 mL (has no administration in time range)   sodium chloride 0.9% bolus 500 mL 500 mL (has no administration in time range)   labetaloL injection 10 mg (has no administration in time range)   bisacodyL suppository 10 mg (has no administration in time range)   atorvastatin tablet 40 mg (40 mg Oral Given 10/11/24 0755)   ondansetron injection 4 mg (has no administration in time range)   iohexoL (OMNIPAQUE 350) injection 100 mL (100 mLs Intravenous Given 10/10/24 1418)   tenecteplase (TNKase) IV KIT 16 mg (16 mg Intravenous Given 10/10/24 1506)   potassium chloride SA CR tablet 20 mEq (20 mEq Oral Given 10/11/24 0600)     Medical Decision Making             ED Course as of 10/11/24 1420   Thu Oct 10, 2024   1450 Per vascular neurology, who reviewed the patient's case, CTA head/neck,  pt is a DEFINITE candidate for TNK. She is requesting administering TNK to this patient once the pt's blood pressure has improved (will initiate nicardipine infusion for tight bp control prior to administration thrombolytic). On my line of  questioning, which was observed by the patient's nurse, Lorene Mathias, the patient denies any known active internal bleeding, history of severe uncontrolled hypertension, history of cerebrovascular accident , history of aneurysm or arteriovenous malformation , history of intracranial neoplasm, intracranial or intraspinal surgery within the last 2 months , head or spinal trauma within the previous 2 months , known bleeding diathesis, hypersensitivity or anaphylaxis reactions to the medication or related excipients    [LC]   1521 On reassessment, approximately after getting TNK 50 minutes ago, patient reports no headache, but does report improvement of her sensation to the upper left and lower left extremities; she is still reports weakness however. [LC]   1551 CTA Head and Neck (xpd)(!)  CT scan of the head:     Subtle loss of the gray-white differentiation in the right subinsular cortex.  MRI of the brain may be obtained for confirmation.     Old lacunar type infarct in the right thalamus.     No evidence of intracranial hemorrhage.     CTA brain:     No large vessel occlusion in the intracranial circulation.     High-grade narrowing the proximal aspect of the right P2.     CTA neck:     No hemodynamically significant stenosis of the neck vessels.     Atherosclerotic plaque at the origin of the left subclavian artery.     8 mm sclerotic lesion in the T4 vertebral body.  Patient follow-up with MRI thoracic spine without and with contrast is suggested.     This report was flagged in Epic as abnormal.         [LC]   1728 No findings of acute intracranial hemorrhage status post TNK administration. [LC]   1741 Case discussed in person with the [facility] hospitalist,  [LC]      ED Course User Index  [LC] Guille Villarreal MD                             Clinical Impression:  Final diagnoses:  [R29.818] Acute focal neurological deficit (Primary)  [R29.898] Weakness of left upper extremity          ED Disposition  Condition    Admit                 Guille Villarreal MD  10/11/24 [id number]

## 2024-10-10 NOTE — TELEMEDICINE CONSULT
[facility]  Vascular Neurology  Comprehensive Stroke Center  TeleVascular Neurology Acute Consultation Note        Consult Information  Consults    Consulting Provider: TRISTEN SLATER   Current Providers  No providers found    Patient Location:  [facility] EMERGENCY DEPARTMENT Emergency Department    Spoke hospital nurse at bedside with patient assisting consultant.  Patient information was obtained from patient.       Stroke Documentation  Acute Stroke Times   Last Known Normal Date: 10/10/24  Last Known Normal Time: 1300  Stroke Team Called Date: 10/10/24  Stroke Team Called Time: 1428  Stroke Team Arrival Date: 10/10/24  Stroke Team Arrival Time: 1433  CT Interpretation Time: 1435  Thrombolytic Therapy Recommended: Yes  Thrombectomy Recommended: No  Decision to Treat Time for Tenecteplase: 1445    NIH Scale:  1a. Level of Consciousness: 0-->Alert, keenly responsive  1b. LOC Questions: 0-->Answers both questions correctly  1c. LOC Commands: 0-->Performs both tasks correctly  2. Best Gaze: 0-->Normal  3. Visual: 0-->No visual loss  4. Facial Palsy: 0-->Normal symmetrical movements  5a. Motor Arm, Left: 1-->Drift, limb holds 90 (or 45) degrees, but drifts down before full 10 seconds, does not hit bed or other support  5b. Motor Arm, Right: 0-->No drift, limb holds 90 (or 45) degrees for full 10 secs  6a. Motor Leg, Left: 1-->Drift, leg falls by the end of the 5-sec period but does not hit bed  6b. Motor Leg, Right: 0-->No drift, leg holds 30 degree position for full 5 secs  7. Limb Ataxia: 0-->Absent  8. Sensory: 1-->Mild-to-moderate sensory loss, patient feels pinprick is less sharp or is dull on the affected side, or there is a loss of superficial pain with pinprick, but patient is aware of being touched  9. Best Language: 0-->No aphasia, normal  10. Dysarthria: 0-->Normal  11. Extinction and Inattention (formerly Neglect): 0-->No abnormality  Total (NIH Stroke Scale): 3      Modified  Nazlini:    [place] Coma Scale:     ABCD2 Score:    [id number] Score:    HAS -BLED Score:    ICH Score:    Hunt & Cantu Classification:      Blood pressure (!) 171/81, pulse 88, temperature 98.3 °F (36.8 °C), temperature source Oral, resp. rate 17, height 5' (1.524 m), weight 63.5 kg (140 lb), SpO2 96%.      In my opinion, this was a: Tier 1; VAN Stroke Assessment: Negative     Medical Decision Making  HPI:  59 y.o. female with h/o HTN presenting with L sided weakness and numbnesss.  Patient reports an episode of L sided numbness taht started when she was laying on her L side.  Symptoms resolved however.  Around 1 pm she again developed L sided numbness except this time with weakness.  No recent bleeding or surgery.     Images personally reviewed and interpreted:  Study: Head CT  Study Interpretation: no acute findings     Assessment and plan:  L hemiparesis loss concerning for subcortical ischemic stroke.  Symptoms are disabling.  Recommend TNK (patient returned to baseline following first episode) when BP <185/110.      Lytics recommendation: Recommend IV Tenecteplase 0.25mg/kg IV push (max dose 25mg); If Tenecteplase is not available use Alteplase 0.9mg/kg IV bolus followed by infusion (max dose 90mg)     BP goal prior to IV thrombolytics - <185/110 - Initiate BP management prior to treatment if not at goal    BP goal post IV thrombolytics - <180/105 for at least 24 hrs. - Continue BP management to maintain goal    Additional Recommendations:   Neurological assessment and vital signs (except temperature) every 15 minutes x 2 hours, then every 30 minutes x 6 hours, then every hour x 16 hours..  Frequency of BP assessments may need to be increased if systolic BP stays >= 180 mm Hg or diastolic BP stays >= 105 mm Hg. Administer antihypertensive meds as ordered  Temperature every 4 hours or as required.  Follow hospital protocol for further orders re: post thrombolytic therapy patient management.  No antithrombotics  or anticoagulants (including but not limited to: heparin, warfarin, aspirin, clopidigrel, or dipyridamole) for 24 hours, then start antithrombotics as ordered by treating physician    Adapted from the American Heart Association/American Stroke Association (AHA/ASA) and American Association of Neuroscience Nurses (AANN) Guidelines.       Clinical Delays to Decision to Treat: NoneCONTRAINDICATIONS THROMBOLYSIS TELESTROKE:[id number]}    Thrombectomy recommendation: No; No large vessel occlusion identified on imaging   Placement recommendation: admit to inpatient               ROS  Physical Exam  Neurological:      Comments: No facial droop  L arm drift, finger taps slower on L  L leg drift  Decr sensation L F/A/L       Past Medical History:   Diagnosis Date    Glaucoma     Hypertension      Past Surgical History:   Procedure Laterality Date    GLAUCOMA SURGERY       Family History   Family history unknown: Yes       Diagnoses  Problem Noted   Acute Ischemic Right Mca Stroke 10/10/2024       Xena Christie MD      Emergent/Acute neurological consultation requested by spoke provider due to critical concerns for possible cerebrovascular event that could result in permanent loss of neurologic/bodily function, severe disability or death of this patient.  Immediate/timely evaluation by a highly prepared expert is paramount for optimal outcomes  High risk for neurological deterioration if not properly diagnosed  High risk for neurological deterioration if not treated promplty/as soon as possible  Complex diagnostic evaluation may be required (advanced imaging)  High risk treatment options (thrombolytics and/or thrombectomy)    Patient care was coordinated with spoke provider, including but not limted to    Discussing likely diagnosis/etiology of symptoms  Making recommendations for further diagnostic studies  Making recommendations for intravenous thrombolytics or other advanced therapies  Making recommendations for disposition  (admission/transfer for higher level of care)

## 2024-10-10 NOTE — ED NOTES
"Patient states the numbness and tingling in her face has subsided. States numbness present in LUE and LLE; however, "not as bad." Weakness/drift still noted upon assessment.  "

## 2024-10-10 NOTE — ED NOTES
"Patient states she feels as if her LUE and LLE are "tight" and "swollen" and "heavy." No swelling noted upon examination.   "

## 2024-10-10 NOTE — ED NOTES
Patient explains all symptoms have resolved. Strength and sensation equal in all extremities. Patient repositioned for swallow screen from laying position to upright position. States she now has mild headache. Alert and oriented. MD made aware. Awaiting orders.

## 2024-10-10 NOTE — SUBJECTIVE & OBJECTIVE
HPI:  59 y.o. female with h/o HTN presenting with L sided weakness and numbnesss.  Patient reports an episode of L sided numbness taht started when she was laying on her L side.  Symptoms resolved however.  Around 1 pm she again developed L sided numbness except this time with weakness.  No recent bleeding or surgery.     Images personally reviewed and interpreted:  Study: Head CT  Study Interpretation: no acute findings     Assessment and plan:  L hemiparesis loss concerning for subcortical ischemic stroke.  Symptoms are disabling.  Recommend TNK (patient returned to baseline following first episode) when BP <185/110.      Lytics recommendation: Recommend IV Tenecteplase 0.25mg/kg IV push (max dose 25mg); If Tenecteplase is not available use Alteplase 0.9mg/kg IV bolus followed by infusion (max dose 90mg)     BP goal prior to IV thrombolytics - <185/110 - Initiate BP management prior to treatment if not at goal    BP goal post IV thrombolytics - <180/105 for at least 24 hrs. - Continue BP management to maintain goal    Additional Recommendations:   Neurological assessment and vital signs (except temperature) every 15 minutes x 2 hours, then every 30 minutes x 6 hours, then every hour x 16 hours..  Frequency of BP assessments may need to be increased if systolic BP stays >= 180 mm Hg or diastolic BP stays >= 105 mm Hg. Administer antihypertensive meds as ordered  Temperature every 4 hours or as required.  Follow hospital protocol for further orders re: post thrombolytic therapy patient management.  No antithrombotics or anticoagulants (including but not limited to: heparin, warfarin, aspirin, clopidigrel, or dipyridamole) for 24 hours, then start antithrombotics as ordered by treating physician    Adapted from the American Heart Association/American Stroke Association (AHA/ASA) and American Association of Neuroscience Nurses (AANN) Guidelines.       Clinical Delays to Decision to Treat: NoneCONTRAINDICATIONS  [facility]IS [id number]}    Thrombectomy recommendation: No; No large vessel occlusion identified on imaging   Placement recommendation: admit to inpatient

## 2024-10-10 NOTE — ED NOTES
Patient presents to the ED with  with c/o numbness to left face, numbness/tingling to LUE and LLE, and weakness to LUE/LLE that began around 1314. Patient states she was laying in bed around 1000am and had some numbness at that time. However, she wrote it off d/t patient laying in same position for a long time. Staets after sometime symptoms resolved. Around 1314 symptoms returned. Patient states numbness started in her lips, radiated to left face, left arm and left leg.. Patient explains she then called her  and came to ED for evaluation. Stroke activation called while in triage.

## 2024-10-10 NOTE — ED NOTES
Admit provider at bedside. During assessment, patient stating that she has some mild weakness to left side.

## 2024-10-10 NOTE — ED NOTES
Patient states she continues with HA. MD at bedside for assessment. Updated patient on need for CT scan. Rating pain 2/10. Alert and oriented.

## 2024-10-11 LAB
ALBUMIN SERPL BCP-MCNC: 3.4 G/DL (ref 3.5–5.2)
ALP SERPL-CCNC: 63 U/L (ref 55–135)
ALT SERPL W/O P-5'-P-CCNC: 11 U/L (ref 10–44)
ANION GAP SERPL CALC-SCNC: 8 MMOL/L (ref 8–16)
APICAL FOUR CHAMBER EJECTION FRACTION: 63 %
APICAL TWO CHAMBER EJECTION FRACTION: 61 %
APTT PPP: 27.1 SEC (ref 21–32)
ASCENDING AORTA: 2.94 CM
AST SERPL-CCNC: 15 U/L (ref 10–40)
AV INDEX (PROSTH): 0.75
AV MEAN GRADIENT: 3.2 MMHG
AV PEAK GRADIENT: 4.8 MMHG
AV VALVE AREA BY VELOCITY RATIO: 2.8 CM²
AV VALVE AREA: 2.6 CM²
AV VELOCITY RATIO: 0.82
BASOPHILS # BLD AUTO: 0.04 K/UL (ref 0–0.2)
BASOPHILS NFR BLD: 0.5 % (ref 0–1.9)
BILIRUB SERPL-MCNC: 0.4 MG/DL (ref 0.1–1)
BSA FOR ECHO PROCEDURE: 1.66 M2
BUN SERPL-MCNC: 15 MG/DL (ref 6–20)
CALCIUM SERPL-MCNC: 8.7 MG/DL (ref 8.7–10.5)
CHLORIDE SERPL-SCNC: 108 MMOL/L (ref 95–110)
CHOLEST SERPL-MCNC: 194 MG/DL (ref 120–199)
CHOLEST/HDLC SERPL: 3.5 {RATIO} (ref 2–5)
CO2 SERPL-SCNC: 22 MMOL/L (ref 23–29)
CREAT SERPL-MCNC: 0.7 MG/DL (ref 0.5–1.4)
CV ECHO LV RWT: 0.37 CM
DIFFERENTIAL METHOD BLD: ABNORMAL
DOP CALC AO PEAK VEL: 1.1 M/S
DOP CALC AO VTI: 24.1 CM
DOP CALC LVOT AREA: 3.5 CM2
DOP CALC LVOT DIAMETER: 2.1 CM
DOP CALC LVOT PEAK VEL: 0.9 M/S
DOP CALC LVOT STROKE VOLUME: 62.7 CM3
DOP CALC MV VTI: 22.2 CM
DOP CALCLVOT PEAK VEL VTI: 18.1 CM
E WAVE DECELERATION TIME: 280.4 MSEC
E/A RATIO: 0.74
E/E' RATIO: 10.83 M/S
ECHO LV POSTERIOR WALL: 0.8 CM (ref 0.6–1.1)
EOSINOPHIL # BLD AUTO: 0.3 K/UL (ref 0–0.5)
EOSINOPHIL NFR BLD: 3.1 % (ref 0–8)
ERYTHROCYTE [DISTWIDTH] IN BLOOD BY AUTOMATED COUNT: 12.3 % (ref 11.5–14.5)
EST. GFR  (NO RACE VARIABLE): >60 ML/MIN/1.73 M^2
ESTIMATED AVG GLUCOSE: 105 MG/DL (ref 68–131)
FRACTIONAL SHORTENING: 32.6 % (ref 28–44)
GLUCOSE SERPL-MCNC: 99 MG/DL (ref 70–110)
HBA1C MFR BLD: 5.3 % (ref 4–5.6)
HCT VFR BLD AUTO: 36.7 % (ref 37–48.5)
HDLC SERPL-MCNC: 56 MG/DL (ref 40–75)
HDLC SERPL: 28.9 % (ref 20–50)
HGB BLD-MCNC: 12.3 G/DL (ref 12–16)
IMM GRANULOCYTES # BLD AUTO: 0.02 K/UL (ref 0–0.04)
IMM GRANULOCYTES NFR BLD AUTO: 0.2 % (ref 0–0.5)
INR PPP: 1 (ref 0.8–1.2)
INTERVENTRICULAR SEPTUM: 0.9 CM (ref 0.6–1.1)
IVC DIAMETER: 1.03 CM
LDLC SERPL CALC-MCNC: 123.4 MG/DL (ref 63–159)
LEFT ATRIUM AREA SYSTOLIC (APICAL 2 CHAMBER): 12.55 CM2
LEFT ATRIUM AREA SYSTOLIC (APICAL 4 CHAMBER): 14.04 CM2
LEFT ATRIUM SIZE: 3.41 CM
LEFT ATRIUM VOLUME INDEX MOD: 20.5 ML/M2
LEFT ATRIUM VOLUME MOD: 33.21 ML
LEFT INTERNAL DIMENSION IN SYSTOLE: 2.9 CM (ref 2.1–4)
LEFT VENTRICLE DIASTOLIC VOLUME INDEX: 51.41 ML/M2
LEFT VENTRICLE DIASTOLIC VOLUME: 83.28 ML
LEFT VENTRICLE END DIASTOLIC VOLUME APICAL 2 CHAMBER: 54.25 ML
LEFT VENTRICLE END DIASTOLIC VOLUME APICAL 4 CHAMBER: 56.69 ML
LEFT VENTRICLE END SYSTOLIC VOLUME APICAL 2 CHAMBER: 30.84 ML
LEFT VENTRICLE END SYSTOLIC VOLUME APICAL 4 CHAMBER: 34.81 ML
LEFT VENTRICLE MASS INDEX: 70.5 G/M2
LEFT VENTRICLE SYSTOLIC VOLUME INDEX: 20.5 ML/M2
LEFT VENTRICLE SYSTOLIC VOLUME: 33.14 ML
LEFT VENTRICULAR INTERNAL DIMENSION IN DIASTOLE: 4.3 CM (ref 3.5–6)
LEFT VENTRICULAR MASS: 114.2 G
LV LATERAL E/E' RATIO: 9.29 M/S
LV SEPTAL E/E' RATIO: 13 M/S
LVED V (TEICH): 83.28 ML
LVES V (TEICH): 33.14 ML
LVOT MG: 1.61 MMHG
LVOT MV: 0.61 CM/S
LYMPHOCYTES # BLD AUTO: 3.4 K/UL (ref 1–4.8)
LYMPHOCYTES NFR BLD: 39.7 % (ref 18–48)
MAGNESIUM SERPL-MCNC: 2 MG/DL (ref 1.6–2.6)
MCH RBC QN AUTO: 28 PG (ref 27–31)
MCHC RBC AUTO-ENTMCNC: 33.5 G/DL (ref 32–36)
MCV RBC AUTO: 84 FL (ref 82–98)
MONOCYTES # BLD AUTO: 0.7 K/UL (ref 0.3–1)
MONOCYTES NFR BLD: 7.8 % (ref 4–15)
MV A" WAVE DURATION": 131.3 MSEC
MV PEAK A VEL: 0.88 M/S
MV PEAK E VEL: 0.65 M/S
MV PEAK GRADIENT: 3 MMHG
MV STENOSIS PRESSURE HALF TIME: 81.32 MS
MV VALVE AREA BY CONTINUITY EQUATION: 2.82 CM2
MV VALVE AREA P 1/2 METHOD: 2.71 CM2
NEUTROPHILS # BLD AUTO: 4.1 K/UL (ref 1.8–7.7)
NEUTROPHILS NFR BLD: 48.7 % (ref 38–73)
NONHDLC SERPL-MCNC: 138 MG/DL
NRBC BLD-RTO: 0 /100 WBC
OHS LV EJECTION FRACTION SIMPSONS BIPLANE MOD: 62 %
PHOSPHATE SERPL-MCNC: 4.4 MG/DL (ref 2.7–4.5)
PISA TR MAX VEL: 2.04 M/S
PLATELET # BLD AUTO: 227 K/UL (ref 150–450)
PMV BLD AUTO: 9.5 FL (ref 9.2–12.9)
POTASSIUM SERPL-SCNC: 3.6 MMOL/L (ref 3.5–5.1)
PROT SERPL-MCNC: 6.3 G/DL (ref 6–8.4)
PROTHROMBIN TIME: 10.8 SEC (ref 9–12.5)
PULM VEIN S/D RATIO: 1.76
PV MV: 0.61 M/S
PV PEAK D VEL: 0.29 M/S
PV PEAK GRADIENT: 2 MMHG
PV PEAK S VEL: 0.51 M/S
PV PEAK VELOCITY: 0.74 M/S
RA PRESSURE ESTIMATED: 3 MMHG
RA VOL SYS: 32.58 ML
RBC # BLD AUTO: 4.39 M/UL (ref 4–5.4)
RIGHT ATRIAL AREA: 12.4 CM2
RIGHT ATRIUM VOLUME AREA LENGTH APICAL 4 CHAMBER: 29.89 ML
RV TB RVSP: 5 MMHG
RV TISSUE DOPPLER FREE WALL SYSTOLIC VELOCITY 1 (APICAL 4 CHAMBER VIEW): 10.06 CM/S
SINUS: 3 CM
SODIUM SERPL-SCNC: 138 MMOL/L (ref 136–145)
STJ: 2.85 CM
TDI LATERAL: 0.07 M/S
TDI SEPTAL: 0.05 M/S
TDI: 0.06 M/S
TR MAX PG: 17 MMHG
TRICUSPID ANNULAR PLANE SYSTOLIC EXCURSION: 1.99 CM
TRIGL SERPL-MCNC: 73 MG/DL (ref 30–150)
TROPONIN I SERPL DL<=0.01 NG/ML-MCNC: <0.006 NG/ML (ref 0–0.03)
TSH SERPL DL<=0.005 MIU/L-ACNC: 3.14 UIU/ML (ref 0.4–4)
TV REST PULMONARY ARTERY PRESSURE: 20 MMHG
WBC # BLD AUTO: 8.44 K/UL (ref 3.9–12.7)
Z-SCORE OF LEFT VENTRICULAR DIMENSION IN END DIASTOLE: -0.64
Z-SCORE OF LEFT VENTRICULAR DIMENSION IN END SYSTOLE: 0.16

## 2024-10-11 PROCEDURE — 84100 ASSAY OF PHOSPHORUS: CPT | Performed by: INTERNAL MEDICINE

## 2024-10-11 PROCEDURE — 80053 COMPREHEN METABOLIC PANEL: CPT | Performed by: INTERNAL MEDICINE

## 2024-10-11 PROCEDURE — 63600175 PHARM REV CODE 636 W HCPCS

## 2024-10-11 PROCEDURE — 97165 OT EVAL LOW COMPLEX 30 MIN: CPT

## 2024-10-11 PROCEDURE — 84484 ASSAY OF TROPONIN QUANT: CPT | Performed by: INTERNAL MEDICINE

## 2024-10-11 PROCEDURE — 25000003 PHARM REV CODE 250: Performed by: INTERNAL MEDICINE

## 2024-10-11 PROCEDURE — 92610 EVALUATE SWALLOWING FUNCTION: CPT

## 2024-10-11 PROCEDURE — 92507 TX SP LANG VOICE COMM INDIV: CPT

## 2024-10-11 PROCEDURE — 51798 US URINE CAPACITY MEASURE: CPT

## 2024-10-11 PROCEDURE — 84443 ASSAY THYROID STIM HORMONE: CPT | Performed by: INTERNAL MEDICINE

## 2024-10-11 PROCEDURE — 25000003 PHARM REV CODE 250: Performed by: FAMILY MEDICINE

## 2024-10-11 PROCEDURE — 85025 COMPLETE CBC W/AUTO DIFF WBC: CPT | Performed by: INTERNAL MEDICINE

## 2024-10-11 PROCEDURE — 83735 ASSAY OF MAGNESIUM: CPT | Performed by: INTERNAL MEDICINE

## 2024-10-11 PROCEDURE — 80061 LIPID PANEL: CPT | Performed by: INTERNAL MEDICINE

## 2024-10-11 PROCEDURE — 97116 GAIT TRAINING THERAPY: CPT

## 2024-10-11 PROCEDURE — 85730 THROMBOPLASTIN TIME PARTIAL: CPT | Performed by: INTERNAL MEDICINE

## 2024-10-11 PROCEDURE — 11000001 HC ACUTE MED/SURG PRIVATE ROOM

## 2024-10-11 PROCEDURE — 25000003 PHARM REV CODE 250

## 2024-10-11 PROCEDURE — 85610 PROTHROMBIN TIME: CPT | Performed by: INTERNAL MEDICINE

## 2024-10-11 PROCEDURE — 97161 PT EVAL LOW COMPLEX 20 MIN: CPT

## 2024-10-11 PROCEDURE — 94761 N-INVAS EAR/PLS OXIMETRY MLT: CPT

## 2024-10-11 PROCEDURE — 97530 THERAPEUTIC ACTIVITIES: CPT

## 2024-10-11 PROCEDURE — 83036 HEMOGLOBIN GLYCOSYLATED A1C: CPT | Performed by: INTERNAL MEDICINE

## 2024-10-11 RX ORDER — ENOXAPARIN SODIUM 100 MG/ML
40 INJECTION SUBCUTANEOUS EVERY 24 HOURS
Status: DISCONTINUED | OUTPATIENT
Start: 2024-10-11 | End: 2024-10-12 | Stop reason: HOSPADM

## 2024-10-11 RX ORDER — NAPROXEN SODIUM 220 MG/1
81 TABLET, FILM COATED ORAL DAILY
Status: DISCONTINUED | OUTPATIENT
Start: 2024-10-12 | End: 2024-10-11

## 2024-10-11 RX ORDER — POTASSIUM CHLORIDE 20 MEQ/1
20 TABLET, EXTENDED RELEASE ORAL ONCE
Status: COMPLETED | OUTPATIENT
Start: 2024-10-11 | End: 2024-10-11

## 2024-10-11 RX ORDER — CLOPIDOGREL BISULFATE 75 MG/1
75 TABLET ORAL DAILY
Status: DISCONTINUED | OUTPATIENT
Start: 2024-10-12 | End: 2024-10-11

## 2024-10-11 RX ORDER — CLOPIDOGREL BISULFATE 75 MG/1
75 TABLET ORAL DAILY
Status: DISCONTINUED | OUTPATIENT
Start: 2024-10-11 | End: 2024-10-12 | Stop reason: HOSPADM

## 2024-10-11 RX ORDER — NAPROXEN SODIUM 220 MG/1
81 TABLET, FILM COATED ORAL DAILY
Status: DISCONTINUED | OUTPATIENT
Start: 2024-10-11 | End: 2024-10-12 | Stop reason: HOSPADM

## 2024-10-11 RX ADMIN — CLOPIDOGREL BISULFATE 75 MG: 75 TABLET ORAL at 09:10

## 2024-10-11 RX ADMIN — ASPIRIN 81 MG CHEWABLE TABLET 81 MG: 81 TABLET CHEWABLE at 09:10

## 2024-10-11 RX ADMIN — POTASSIUM CHLORIDE 20 MEQ: 1500 TABLET, EXTENDED RELEASE ORAL at 06:10

## 2024-10-11 RX ADMIN — ENOXAPARIN SODIUM 40 MG: 40 INJECTION SUBCUTANEOUS at 09:10

## 2024-10-11 RX ADMIN — ATORVASTATIN CALCIUM 40 MG: 40 TABLET, FILM COATED ORAL at 07:10

## 2024-10-11 NOTE — CONSULTS
[facility] - Intensive Care  Adult Nutrition  Consult Note    SUMMARY     Recommendations    Recommendation:  1. Encourage intake at meals as tolerated. 2. Monitor weight/labs.   3. RD to follow to monitor po intake    Goals:  Pt will tolerate diet with at least 50-75% intake at meals by RD follow up  Nutrition Goal Status: new  Communication of RD Recs: reviewed with RN    Assessment and Plan  No nutrition dx at this time     Malnutrition Assessment  Orbital Region (Subcutaneous Fat Loss): well nourished  Upper Arm Region (Subcutaneous Fat Loss): well nourished  Thoracic and Lumbar Region: well nourished   [place] Region (Muscle Loss): well nourished  Clavicle Bone Region (Muscle Loss): well nourished  Clavicle and Acromion Bone Region (Muscle Loss): well nourished  Scapular Bone Region (Muscle Loss): well nourished  Dorsal Hand (Muscle Loss): well nourished  Patellar Region (Muscle Loss): well nourished  Anterior Thigh Region (Muscle Loss): well nourished  Posterior Calf Region (Muscle Loss): well nourished     Reason for Assessment  Reason For Assessment: consult (stroke pathway)  Diagnosis:  (stroke)  General Information Comments: Pt admitted with stroke. Pt on Cardiac diet with fair intake at meals. Denies difficulty with chewing or swallowing. Discussed importance of following a low Na diet. Reviewed over foods high in sodium to avoid and cooking without salt. William 22-skin intact. NFPE completed today 10/11-nourished. No recent weight hx per chart  Nutrition Discharge Planning: pt to d/c on Cardiac diet    Past Medical History:   Diagnosis Date    Glaucoma     Hypertension         Nutrition Risk Screen  Nutrition Risk Screen: no indicators present    Nutrition/Diet History  Food Preferences: no Uatsdin or cultural food prefs identified  Spiritual, Cultural Beliefs, [employer] Practices, Values that Affect Care: no  Factors Affecting Nutritional Intake: None identified at this time    Anthropometrics  Temp:  98.4 °F (36.9 °C)  Height Method: Stated  Height: 5' (152.4 cm)  Height (inches): 60 in  Weight Method: Bed Scale  Weight: 64.9 kg (143 lb 1.3 oz)  Weight (lb): 143.08 lb  Ideal Body Weight (IBW), Female: 100 lb  % Ideal Body Weight, Female (lb): 143.08 %  BMI (Calculated): 27.9  BMI Grade: 25 - 29.9 - overweight     Lab/Procedures/Meds  Pertinent Labs Reviewed: reviewed  Pertinent Labs Comments: Alb 3.4L  Pertinent Medications Reviewed: reviewed    Estimated/Assessed Needs  Weight Used For Calorie Calculations: 64.9 kg (143 lb 1.3 oz)  Energy Calorie Requirements (kcal): 1622 (25 kcal/kg)  Energy Need Method: Kcal/kg  Protein Requirements: 64g (1.0g/kg)  Weight Used For Protein Calculations: 64.9 kg (143 lb 1.3 oz)  Estimated Fluid Requirement Method: RDA Method  RDA Method (mL): 1622     Nutrition Prescription Ordered  Current Diet Order: Cardiac    Evaluation of Received Nutrient/Fluid Intake  I/O: 0/1200  Energy Calories Required: meeting needs  Protein Required: meeting needs  Fluid Required: meeting needs  Comments: LBM 10/9  % Intake of Estimated Energy Needs: 50 - 75 %  % Meal Intake: 50 - 75 %    Nutrition Risk  Level of Risk/Frequency of Follow-up:  (1xweekly)     Monitor and Evaluation  Food and Nutrient Intake: food and beverage intake  Food and Nutrient Adminstration: diet order  Physical Activity and Function: nutrition-related ADLs and IADLs  Anthropometric Measurements: weight  Biochemical Data, Medical Tests and Procedures: electrolyte and renal panel  Nutrition-Focused Physical Findings: overall appearance     Nutrition Related Social Determinants of Health: SDOH: Adequate food in home environment     Nutrition Follow-Up  RD Follow-up?: Yes

## 2024-10-11 NOTE — PT/OT/SLP EVAL
Physical Therapy Evaluation and Treatment    Patient Name:  Agnes Worley   MRN:  [id number]    Recommendations:     Discharge Recommendations: Low Intensity Therapy (OP PT)   Discharge Equipment Recommendations: none   Barriers to discharge: None    Assessment:     Agnes Worley is a 59 y.o. female admitted with a medical diagnosis of Acute ischemic right PCA stroke.  She presents with the following impairments/functional limitations: weakness, gait instability, impaired balance, decreased lower extremity function.    PT/OT co-evaluation due to anticipated complexity of pt's presentation. Pt's PLOF: independent with no AD. Pt at this time requires SBA with bed mobility and CGA/SBA with transfers to standing/ambulation with no AD. Therapy recommending low intensity therapy- OP PT with family assistance. Therapy will continue to progress pt as able.     Rehab Prognosis: Good; patient would benefit from acute skilled PT services to address these deficits and reach maximum level of function.    Recent Surgery: * No surgery found *      Plan:     During this hospitalization, patient to be seen 2 x/week to address the identified rehab impairments via gait training, therapeutic activities, therapeutic exercises, neuromuscular re-education and progress toward the following goals:    Plan of Care Expires:  11/11/24    Subjective     Chief Complaint: resolved L side numbness; reports BLE feel like she has ankle weights on while walking  Patient/Family Comments/goals: to progress functional independence  Pain/Comfort:  Pain Rating 1: 0/10  Pain Rating Post-Intervention 1: 0/10    Patients cultural, spiritual, Anabaptism conflicts given the current situation: no    Living Environment:  Lives with spouse in 1 story home with threshold step to enter. Tub/shower.   Prior to admission, patients level of function was Independent with no AD.  Equipment used at home: none.  DME owned (not currently used): none.  Upon  discharge, patient will have assistance from spouse.    Objective:     Communicated with Nurse prior to session.  Patient found HOB elevated with telemetry, blood pressure cuff, PureWick, pulse ox (continuous)  upon PT entry to room.    General Precautions: Standard, fall  Orthopedic Precautions:N/A   Braces: N/A  Respiratory Status: Room air    Exams:  Cognitive Exam:  Patient is oriented to Person, Place, Time, and Situation  Sensation:    -       Intact  light/touch to BLE  RLE ROM: WFL  RLE Strength: 3+/5 hip flexion, knee extension and ankle PF/DF  LLE ROM: WFL  LLE Strength: 3+/5 hip flexion, 3-/5 knee extension and 3+/5 ankle PF/DF      Functional Mobility:  Bed Mobility:     Supine to Sit: stand by assistance  Transfers:     Sit to Stand:  stand by assistance with no AD  Bed to Chair: stand by assistance with  no AD  using  Step Transfer  Gait: ~100ft with CGA and no AD; reports feeling like she has B ankle weights on her legs while walking; decreased gait speed as compared to baseline per pt report      AM-PAC 6 CLICK MOBILITY  Total Score:19       Treatment & Education:  Pt able to understand and communicate in English during session.   Pt educated on role of therapy.   Pt reports resolved numbness to L side with minor L side weakness still noted.   Pt able to maintain static standing balance for self care at sink with SBA and no AD.   Pt educated to call for staff assistance with mobility in room- pt understanding   BP at end of session 127/58; no reports of pain or dizziness.     Patient left up in chair with all lines intact, call button in reach, Nurse notified, and Nurse present.    GOALS:   Multidisciplinary Problems       Physical Therapy Goals          Problem: Physical Therapy    Goal Priority Disciplines Outcome Interventions   Physical Therapy Goal     PT, PT/OT Progressing    Description: Goals to be met by: 11/11/24     Patient will increase functional independence with mobility by  performin. Supine to sit with [place]  2. Sit to supine with [place]    3. Sit to stand transfer with [place] with no AD.  4. Bed to chair transfer with [place] using No Assistive Device  5. Gait  x 150 feet with [place] using No Assistive Device.                          History:     Past Medical History:   Diagnosis Date    Glaucoma     Hypertension        Past Surgical History:   Procedure Laterality Date    GLAUCOMA SURGERY         Time Tracking:     PT Received On: 10/11/24  PT Start Time: 852     PT Stop Time: 912  PT Total Time (min): 20 min With OT    Billable Minutes: Evaluation 8 and Gait Training 10      10/11/2024

## 2024-10-11 NOTE — CONSULTS
[facility] NEUROLOGY CONSULT  EVALUATION    Reason for consult: Numbness  Informant: Patient    Other sources of information : Chart check and primary team     CC:  Numbness (Pt reports left sided facial numbness that radiated down left arm that started at 1000 on 10/10/24. Pt reports symptoms resolved and then returned back around 1300 with Left sided weakness. Pt took 2 ASA      HPI:   Per Chart check   Agnes Worley is a 59 y.o. female with  has a past medical history of Glaucoma and Hypertension. who presented to [facility] on 10/10 w/ numbness to the L face, and weakness of the LUE, LLE that began around 1pm. Stroke activated at 2pm. CTH w/o bleed. Decision was made to give TNK at 1506. Was admitted to the MICU for post TNK monitoring     Upon evaluation   Patient had confirmed the above Hx. Had numbness in her left side of her face at 6 am yesterday which had moved to her hand, resolved in several mins after take ASA , then she had another attack which lasts , she felt that her left arm was weak. She reported having 3 similar attacks before last one was a year ago. She also reported sometimes she had sudden dizziness. She is not on any Antiplatelet at home. She reported that the numbness and the weakness had improved after the TNK yesterday. Patient also had reported that she had a brother  from arrhythmias and parents PMHx of heart attacks. She denies smoking or any illicit drugs.Today upon evaluation patient denies any current acute changes in vision, hearing, speech, swallowing, balance, headaches, weakness or changes in sensations. Denies any other acute neurological complains or concerns at the time of evaluation. Patient denies any side effects of home medications   ROS:   Per HPI     Histories:     Allergies:  Codeine and Hydrocodone-acetaminophen    Outpatient Medications:  Current Outpatient Medications   Medication Instructions    albuterol (PROAIR HFA) 90 mcg/actuation inhaler 2 puffs,  Inhalation, Every 6 hours PRN, Rescue    ascorbic acid (VITAMIN C ORAL) 1 tablet, Daily    cetirizine HCl (ZYRTEC ORAL) 1 tablet, As needed (PRN)    ergocalciferol, vitamin D2, (VITAMIN D ORAL) 1 tablet, Daily    ibuprofen (ADVIL,MOTRIN) 600 mg, Oral, Every 6 hours PRN    ipratropium (ATROVENT) 0.03 % nasal spray 2 sprays, Nasal, 2 times daily PRN    lisinopriL (PRINIVIL,ZESTRIL) 20 mg    MAGNESIUM ORAL 1 tablet, Daily    meclizine (ANTIVERT) 25 mg, Oral, 3 times daily PRN    multivitamin (ONE DAILY MULTIVITAMIN) per tablet 1 tablet, Daily    ondansetron (ZOFRAN-ODT) 4 mg, Oral, Every 6 hours PRN    POTASSIUM ORAL 1 tablet, Daily    travoprost (TRAVATAN Z) 0.004 % ophthalmic solution 1 drop, Nightly     Scheduled Inpatient Medications:      atorvastatin  40 mg Oral Daily       Current Facility-Administered Medications:     bisacodyL, 10 mg, Rectal, Daily PRN    labetalol, 10 mg, Intravenous, Q4H PRN    ondansetron, 4 mg, Intravenous, Q12H PRN    sodium chloride 0.9%, 500 mL, Intravenous, PRN    sodium chloride 0.9%, 10 mL, Intravenous, PRN     Past Medical/Surgical/Family/Social History:  PMHx:   Past Medical History:   Diagnosis Date    Glaucoma     Hypertension       Surgeries:   Past Surgical History:   Procedure Laterality Date    GLAUCOMA SURGERY        Family  Hx:   Family History   Family history unknown: Yes   Patient also had reported that she had a brother  from arrhythmias and parents PMHx of heart attacks.     Social Hx:   Social History     Tobacco Use    Smoking status: Never    Smokeless tobacco: Never   Substance Use Topics    Alcohol use: Yes     Comment: Social    Drug use: Never       Current Evaluation:     Vital Signs:   Vitals:    10/11/24 1200   BP: 134/61   Pulse: 66   Resp: 15   Temp:         NIH Stroke Scale     Current NIH Stroke Score Values      Flowsheet Row Most Recent Value   Interval shift assessment   1a. Level of Consciousness 0-->Alert, keenly responsive   1b. LOC Questions  0-->Answers both questions correctly   1c. LOC Commands 0-->Performs both tasks correctly   2. Best Gaze 0-->Normal   3. Visual 0-->No visual loss   4. Facial Palsy 0-->Normal symmetrical movements   5a. Motor Arm, Left 0-->No drift, limb holds 90 (or 45) degrees for full 10 secs   5b. Motor Arm, Right 0-->No drift, limb holds 90 (or 45) degrees for full 10 secs   6a. Motor Leg, Left 0-->No drift, leg holds 30 degree position for full 5 secs   6b. Motor Leg, Right 0-->No drift, leg holds 30 degree position for full 5 secs   7. Limb Ataxia 0-->Absent   8. Sensory 0-->Normal, no sensory loss   9. Best Language 0-->No aphasia, normal   10. Dysarthria 0-->Normal   11. Extinction and Inattention (formerly Neglect) 0-->No abnormality   Total (NIH Stroke Scale) 0     Neurological Exam   Orientation  Alert, awake, oriented to self, place, time, and situation.  Memory  Recent and remote memory appears intact. Not formulary tested   Language  Fluent speech. No dysarthria, No aphasia.   Cranial Nerves  PERRL, VF intact, EOMI, V1-V3 intact, symmetric facial expression, hearing grossly intact, SCM & TPZ 5/5, tongue midline, symmetric palate elevation.  Motor  Normal Bulk, normal Tone  Strength 5/5 throughout   DTR  brisk symmetric  Sensory  Intact light touch throughout  Cerebellar/Gait  Normal finger to nose and heel to shin.   gait and stance deferred      LABORATORY STUDIES:  CBC:   Recent Labs   Lab 10/10/24  1435 10/11/24  0501   WBC 6.81 8.44   HGB 13.3 12.3   HCT 40.4 36.7*    227   MCV 84 84   RDW 12.1 12.3     BMP:   Recent Labs   Lab 10/10/24  1435 10/11/24  0501    138   K 3.7 3.6    108   CO2 24 22*   BUN 12 15   CREATININE 0.8 0.7   GLU 84 99   CALCIUM 8.8 8.7   MG  --  2.0   PHOS  --  4.4     LFTs:   Recent Labs   Lab 10/10/24  1435 10/11/24  0501   PROT 6.4 6.3   ALBUMIN 3.4* 3.4*   BILITOT 0.4 0.4   AST 15 15   ALKPHOS 63 63   ALT 13 11     Coags:   Recent Labs   Lab 10/10/24  8252  10/10/24  1436 10/11/24  0501   INR 1.0 1.1 1.0     FLP:   Recent Labs   Lab 10/10/24  1435 10/11/24  0501   CHOL 194 194   LDLCALC 112.4 123.4   TRIG 123 73   CHOLHDL 29.4 28.9     DM:   Recent Labs   Lab 10/10/24  1435 10/11/24  0501   HGBA1C  --  5.3   LDLCALC 112.4 123.4   CREATININE 0.8 0.7     Thyroid:   Recent Labs   Lab 10/10/24  1435 10/11/24  0501   TSH 1.984 3.140     Cardiac:   Recent Labs   Lab 10/10/24  1435 10/11/24  0501   TROPONINI <0.006 <0.006       RADIOLOGY STUDIES:  Imaging:   MRI Thoracic Spine Without Contrast   Final Result      Nonspecific 8 mm sclerotic focus right T4 vertebral body without encroachment on the spinal canal.  This may reflect a bone island unless otherwise suspected clinically.  Follow-up for stability as clinically warranted.      Mild degenerative changes without advanced stenosis throughout the thoracic spine..         Electronically signed by: Atif Adkins   Date:    10/11/2024   Time:    08:52      MRI Brain Without Contrast   Final Result      No acute abnormality.      Small lacunar infarct posterior right thalamus. Few scattered supratentorial white matter T2/flair hyperintense signal foci suggesting sequela of chronic small vessel ischemic change.            Electronically signed by: Yohan Hernández MD   Date:    10/11/2024   Time:    02:24      MRA Brain   Final Result      The stable vascular distribution of the intracranial circulation with isolate focal high-grade stenosis or hypoplastic segment at the P1 P2 junction on the right.         Electronically signed by: Stanton Elliott   Date:    10/11/2024   Time:    00:25      US Lower Extremity Veins Bilateral   Final Result      No evidence of deep venous thrombosis in either lower extremity.         Electronically signed by: Vilma Elliott   Date:    10/10/2024   Time:    20:17      CT Head Without Contrast   Final Result      No interval developing infarct especially in the distribution of the right PCA  territory.      No detrimental change in the insular cortex on the right.      No evidence of hemorrhage post thrombo lytic therapy.      As mentioned before, consider baseline MRI if clinically warranted.         Electronically signed by: Stanton Elliott   Date:    10/10/2024   Time:    17:21      CTA Head and Neck (xpd)   Final Result   Abnormal      CT scan of the head:      Subtle loss of the gray-white differentiation in the right subinsular cortex.  MRI of the brain may be obtained for confirmation.      Old lacunar type infarct in the right thalamus.      No evidence of intracranial hemorrhage.      CTA brain:      No large vessel occlusion in the intracranial circulation.      High-grade narrowing the proximal aspect of the right P2.      CTA neck:      No hemodynamically significant stenosis of the neck vessels.      Atherosclerotic plaque at the origin of the left subclavian artery.      8 mm sclerotic lesion in the T4 vertebral body.  Patient follow-up with MRI thoracic spine without and with contrast is suggested.      This report was flagged in Epic as abnormal.         Electronically signed by: Deepak Barros MD   Date:    10/10/2024   Time:    15:44        EKG :  Results for orders placed or performed during the hospital encounter of 06/12/21   EKG 12-lead    Collection Time: 06/13/21  1:37 PM    Narrative    Test Reason : R94.31,    Vent. Rate : 067 BPM     Atrial Rate : 067 BPM     P-R Int : 166 ms          QRS Dur : 078 ms      QT Int : 446 ms       P-R-T Axes : 018 036 057 degrees     QTc Int : 471 ms    Normal sinus rhythm  Cannot rule out Anterior infarct (cited on or before 13-JUN-2021)  Abnormal ECG  When compared with ECG of 13-JUN-2021 00:06,  Questionable change in The axis  Confirmed by Maninder Carvalho MD ([id number]) on 6/21/2021 12:38:04 PM    Referred By: [email or web address]   SELF           Confirmed By:Maninder Carvalho MD          OTHER STUDIES/PROCEDURES:  TTE     Left Ventricle: The left ventricle is  normal in size. Normal wall thickness. There is normal systolic function. Biplane (2D) method of discs ejection fraction is 62%. There is normal diastolic function.    Right Ventricle: Normal right ventricular cavity size. Wall thickness is normal. Systolic function is normal.    Left Atrium: Patent foramen ovale visualized with predominant right to left shunting.    Mitral Valve: There is mild regurgitation.    Pulmonary Artery: The estimated pulmonary artery systolic pressure is 20 mmHg.    IVC/SVC: Normal venous pressure at 3 mmHg.    Assessment/Plan:  BERNARDA Worley is a 59 y.o. female with  has a past medical history of Glaucoma and Hypertension who presented to [facility] on 10/10 w/ numbness to the L face, and weakness of the LUE, LLE that began around 1pm. Stroke activated at 2pm. CTH w/o bleed. Decision was made to give TNK at 1506. Was admitted to the MICU for post TNK monitoring work up with MRI with Chronic lacunar infarct R post thalamus with concerns for TIA Vs recrudescence of previous stroke symptoms.     # Chronic Lacunar infarct R post thalamus     Plan:    Stroke acute management:  - IV- TNK candidate: yes on 10/10 at 1506  - Admit to:MICU  - Frequent neuro-checks (q2h) till 1500 10/11  - Permissive HTN for 24h  - AVOID arterial sticks, venipuncture, insertion of indwelling urethral catheter, insertion of NG tube during the first 24 hours after IV thrombolytic infusion.  - AVOID antiplatelet and antithrombotic medications during the first 24 hours of thrombolytic infusion.  - Baseline EKG and CXR  - Basic labs: CBC, CMP  - NPO until bedside swallow eval, SLP eval  - Repeat CT head at 24 hours from the TNK if stable     Stroke workup:  -CTH: Subtle loss of the gray-white differentiation in the right subinsular cortex.   -CTA/ MRA head and neck : isolate focal high-grade stenosis or hypoplastic segment at the P1 P2 junction on the right. Discussed the case with Dr. Cunningham vascular  neurology in the main campus, it appears to be a hypoplastic segment rather than a stenosis since the Pcomm is feeding the P2 segment directly, Advised to keep her on DAPTx 90 days out of the abundance of caution since there is consideration for ICAD.   - MRI w/o contrast: Chronic small lacunar infarct posterior right thalamus. Few scattered supratentorial white matter T2/flair hyperintense signal foci suggesting sequela of chronic small vessel ischemic change   - TTE with bubble EF 62 % with patent foramen ovale visualized with predominant right to left shunting, Will appreciate Cardiology input and evaluation for Holter monitor placement.  Patient also had reported that she had a brother  from arrhythmias and parents PMHx of heart attacks.   - Recommend continuous cardiac telemetry to monitor for arrhythmia  -Stroke labwork: HgbA1C 5.3, lipid panel , urine drug screen, TSH 3.1, ESR, Vitamin B-12, Folate    Secondary prevention of stroke:  -Atorvastatin 40 mg daily (long-term goal LDL < 70)  -Tight glucose control (BG between 140-180, long-term goal HgbA1c < 6%)  - Repeat CT head at 24 hours from the TNK (@1500 on 10/11)  if stable stable can begin on DAPT therapy with ASA 81 mg and (Plavix 75 mg for 90 days)    Stroke rehabilitation:  -Physical therapy, occupational therapy, speech therapy consults  -Please consult stroke Coordinator  - Close follow up with outpatient neurology   - Close follow up with outpatient cardiology   - Educated patient on risk factors for stroke including diabetes, hypertension, hyperlipidemia, tobacco smoking  - Educated patient on signs/symptoms of stroke and to call 911 immediately if such occurs outside this hospitalization    Patient / Family Stroke Education  I have discussed the patient's stroke risk factors and the importance to modify them in order to reduce the risk of future events.  Also, the importance of a healthy diet and daily exercise in order to reduce the risk  of future strokes.  Discussed the importance of being compliant with the medications given at discharge.  I went over the usual stroke warning signs and symptoms, and the need to activate EMS (call 911) as soon as the symptoms present.  - Sudden onset numbness or weakness of the face, arm, or leg; especially on one side of the body  - Sudden confusion, trouble speaking, or understanding  - Sudden trouble seeing in one or both eyes  - Sudden trouble walking, dizziness, loss of coordination  - Sudden severe headache with no known cause      Differential diagnosis was explained to the patient. All questions were answered. Patient understood and agreed to adhere to plan. Plan was communicated to the primary team.    No further acute intervention from inpatient neurology team at this time. Please call the neurology on call with any questions regarding the recommendations made for the care of this patient. Please feel free to reconsult with any worsening symptoms or new neurologic concerns. Thank you for allowing us to participate in this patient's care.    Case to be discussed with Dr. Wing     Thank you for your consult.     Jeancarlos Aranda MD   [facility]

## 2024-10-11 NOTE — EICU
59-year-old female admitted with left PCA stroke status post tenecteplase with complete resolution of symptoms.  Past history of hypertension.      On camera assessment patient alert and awake and communicative   Pulse rate 66, SpO2 93% on room air, respiratory rate 13, blood pressure 105/55    Data  CTA head and neck revealed subtle loss of the gray-white differentiation in the right subinsular cortex old lacunar infarct in the right thalamus, no large vessel occlusion intracranial circulation, atherosclerotic plaque at the origin of the left subclavian artery, 8 mm sclerotic lesion in the T4 vertebral body  MRA brain with stable vascular distribution of intracranial circulation with isolate focal high-grade stenosis or hypoplastic segment at the P1 P2 junction on the right.    EKG normal sinus rhythm 67 per minute       Assessment and plan   1. Left PCA stroke status post thrombolytic neurology recommendations, to keep systolic blood pressure less than 180 mmHg with a nicardipine, restart medications as recommended by Neurology.    2. Hypertension-maintain blood pressure for now with nicardipine, restart oral medications when possible.      DVT prophylaxis-SCDs.

## 2024-10-11 NOTE — PLAN OF CARE
Pt AAOx4. Last NIH 0, no deficits noted. Afebrile. HR & BP stable on monitor. HR low 60's when asleep. HR 70's-80's when awake. SBP low 100's, DBP high 50's when asleep. 120's/60's when awake. NSR on monitor. O2 sats stable on RA. No BM since admit. UO 1200mL in ED, none since admit to ICU, bladder scan 246mL. Labs reviewed, K 3.6, Hemanth GUZMAN notified and orders to replace fulfilled. Safety maintained. POC reviewed with pt, stroke booklet education provided. Care ongoing.    Problem: Stroke, Ischemic (Includes Transient Ischemic Attack)  Goal: Optimal Coping  Outcome: Progressing  Goal: Effective Bowel Elimination  Outcome: Progressing     Problem: Stroke, Ischemic (Includes Transient Ischemic Attack)  Goal: Optimal Cerebral Tissue Perfusion  Reactivated  Goal: Optimal Cognitive Function  Reactivated  Goal: Improved Communication Skills  Reactivated  Goal: Optimal Functional Ability  Reactivated  Goal: Optimal Nutrition Intake  Reactivated  Goal: Effective Oxygenation and Ventilation  Reactivated  Goal: Improved Sensorimotor Function  Reactivated  Goal: Safe and Effective Swallow  Reactivated  Goal: Effective Urinary Elimination  Reactivated     Problem: Fall Injury Risk  Goal: Absence of Fall and Fall-Related Injury  Reactivated     Problem: Adult Inpatient Plan of Care  Goal: Plan of Care Review  Reactivated  Goal: Patient-Specific Goal (Individualized)  Reactivated  Goal: Absence of Hospital-Acquired Illness or Injury  Reactivated  Goal: Optimal Comfort and Wellbeing  Reactivated  Goal: Readiness for Transition of Care  Reactivated

## 2024-10-11 NOTE — PLAN OF CARE
Problem: Physical Therapy  Goal: Physical Therapy Goal  Description: Goals to be met by: 24     Patient will increase functional independence with mobility by performin. Supine to sit with [place]  2. Sit to supine with [place]    3. Sit to stand transfer with [place] with no AD.  4. Bed to chair transfer with [place] using No Assistive Device  5. Gait  x 150 feet with [place] using No Assistive Device.     Outcome: Progressing     PT/OT co-evaluation due to anticipated complexity of pt's presentation. Pt's PLOF: independent with no AD. Pt at this time requires SBA with bed mobility and CGA/SBA with transfers to standing/ambulation with no AD. Therapy recommending low intensity therapy- OP PT with family assistance. Therapy will continue to progress pt as able.

## 2024-10-11 NOTE — PLAN OF CARE
Problem: SLP  Goal: SLP Goal  Description: Short Term Goals:  1. Pt will participate in a clinical swallow eval to determine least restrictive diet. -MET 10/11  2. Pt will safely tolerate >75% of REGULAR diet with THIN liquids with no overt s/s of aspiration. -MET 10/11  3. Pt will participate in informal speech-language and cognitive eval to r/o related deficits. -MET 10/11    Outcome: Met      Pt participated in clinical swallow eval and informal speech-language and cognitive eval this date. Pt appears to be at baseline for speech-language, cognition, and swallowing. Pt is safe to continue regular/thin po diet following standard swallow precautions.

## 2024-10-11 NOTE — HPI
59-year-old female with a past medical history of hypertension came to emergency department because of left-sided facial, left-sided upper extremity weakness and numbness.  The patient was also states she has a family history of strokes.  Patient was states she had headache for most of yesterday, it was concentrated in the front of her forehead and rated 10/10.  This morning she woke up around 6:00 a.m. with a continued headache, went back to bed around 8:00 a.m. and woke up around 945 to speak with her friend on the phone.  While on the phone she noticed tingling and numbing sensation on the left side of her lip and left hand.  She also mentioned it was taking longer for her to process information.  She finished over conversations, completed her house chores in the around 1:00 p.m. was speaking with her sister when she noticed the same symptoms again.  This time the numbness had worsened as well as the weakness.  She had these symptoms on the same left side of her face, upper and lower extremities.  The patient was stated she took 2 aspirins and called her  to inform him of her symptoms.  Her family decided to bring her to the emergency department.    CT head was done in the emergency department and showed no interval developing infarct especially in the distribution of the right PCA territory.  CTA head and neck showed CT scan of the head: Subtle loss of the gray-white differentiation in the right subinsular cortex.  MRI of the brain may be obtained for confirmation. Old lacunar type infarct in the right thalamus. No evidence of intracranial hemorrhage. CTA brain: No large vessel occlusion in the intracranial circulation. High-grade narrowing the proximal aspect of the right P2.  CTA neck: No hemodynamically significant stenosis of the neck vessels. Atherosclerotic plaque at the origin of the left subclavian artery. 8 mm sclerotic lesion in the T4 vertebral body.  Patient follow-up with MRI thoracic spine  without and with contrast is suggested.      Tele neurology was consulted and recommended:    -IV Tenecteplase 0.25mg/kg IV push (max dose 25mg); If Tenecteplase is not available use Alteplase 0.9mg/kg IV bolus followed by infusion (max dose 90mg)   -BP goal prior to IV thrombolytics - <185/110 - Initiate BP management prior to treatment if not at goal  -BP goal post IV thrombolytics - <180/105 for at least 24 hrs. - Continue BP management to maintain goal    On physical examination, numbness on the left side of her face has been resolved.  She was regained most of the strength in her left upper extremity as well as left lower extremity.  Patient was to be admitted for acute stroke workup.

## 2024-10-11 NOTE — PT/OT/SLP EVAL
"Speech Language Pathology Evaluation  Bedside Swallow    Patient Name:  Agnes Worley   MRN:  [id number]  Admitting Diagnosis: Acute ischemic right PCA stroke    Recommendations:                 General Recommendations:  Follow-up not indicated  Diet recommendations:  Regular Diet - IDDSI Level 7, Thin liquids - IDDSI Level 0   Aspiration Precautions: 1 bite/sip at a time, Alternating bites/sips, Feed only when awake/alert, and HOB to 90 degrees   General Precautions: Standard, fall  Communication strategies:  none    Assessment:     Agnes Worley is a 59 y.o. female with an SLP diagnosis of CVA. Pt appears to be at baseline for speech-language, cognition, and swallowing.     History:     Past Medical History:   Diagnosis Date    Glaucoma     Hypertension        Past Surgical History:   Procedure Laterality Date    GLAUCOMA SURGERY         Social History: Patient lives with family at home.    Prior Intubation HX:  NA    Modified Barium Swallow: NA    Chest X-Rays: None on file    CT HEAD: No interval developing infarct especially in the distribution of the right PCA territory.     No detrimental change in the insular cortex on the right.     No evidence of hemorrhage post thrombo lytic therapy.     As mentioned before, consider baseline MRI if clinically warrante     Prior diet: Regular/thin.    Subjective     Pt seated in chair next to bed upon ST entry. Pt agreeable to participate in session with . ST confirmed with RN prior to entry.   Patient goals: "I feel almost back to myself"      Pain/Comfort:  Pain Rating 1: 0/10    Respiratory Status: Room air    Objective:   Cognition/Communication: Awake, alert, able to sustain attention and demonstrated timely processing and initiation throughout session. Oriented to name, , current place, current month/year and reasoning for current admission. Able to follow all commands and fluently express all wants/needs during session. Able to recall events " leading to current admission and other pmhx without difficulty. Also demonstrated good turn-taking and topic maintenance throughout session. Pt was able to name common objects in the room, participate in automatic speech tasks, and repeat after st with 100% accy and no anomia or dysarthria present.     Oral Musculature Evaluation  Oral Musculature: WFL  Dentition: present and adequate  Secretion Management: adequate  Mucosal Quality: good  Mandibular Strength and Mobility: WFL  Oral Labial Strength and Mobility: WFL  Lingual Strength and Mobility: WFL  Velar Elevation: WFL  Buccal Strength and Mobility: WFL  Volitional Swallow:  (timely swallow initiation)  Voice Prior to PO Intake:  (clear; 100% intelligible)    Bedside Swallow Eval:   Consistencies Assessed:  Thin liquids via straw sips x9  Solids bites of mickey crackers x5      Oral Phase:   WFL    Pharyngeal Phase:   no overt clinical signs/symptoms of aspiration  no overt clinical signs/symptoms of pharyngeal dysphagia    Compensatory Strategies  None    Treatment: Pt appears to be at baseline for speech-language, cognition, and swallowing. No further skilled st services are warranted at this time. Please re-consult should the pt experience a change in status.     Goals:   Multidisciplinary Problems       SLP Goals       Not on file              Multidisciplinary Problems (Resolved)          Problem: SLP    Goal Priority Disciplines Outcome   SLP Goal   (Resolved)     SLP Met   Description: Short Term Goals:  1. Pt will participate in a clinical swallow eval to determine least restrictive diet. -MET 10/11  2. Pt will safely tolerate >75% of REGULAR diet with THIN liquids with no overt s/s of aspiration. -MET 10/11  3. Pt will participate in informal speech-language and cognitive eval to r/o related deficits. -MET 10/11                         Plan:     Patient to be seen:      Plan of Care expires:  11/10/24  Plan of Care reviewed with:      SLP Follow-Up:  No        Discharge recommendations:  Low Intensity Therapy   Barriers to Discharge:  None    Time Tracking:     SLP Treatment Date:   10/11/24  Speech Start Time:  0925  Speech Stop Time:  0948     Speech Total Time (min):  23 min    Billable Minutes: Speech Therapy Individual 13 and Eval Swallow and Oral Function 10    10/11/2024

## 2024-10-11 NOTE — PT/OT/SLP EVAL
"Occupational Therapy   Evaluation    Name: Agnes Worley  MRN: [id number]  Admitting Diagnosis: Acute ischemic right PCA stroke  Recent Surgery: * No surgery found *      Recommendations:     Discharge Recommendations: Low Intensity Therapy  Discharge Equipment Recommendations:  none  Barriers to discharge:  None    Assessment:     Agnes Worley is a 59 y.o. female with a medical diagnosis of Acute ischemic right PCA stroke.  She presents with performance deficits affecting function: weakness, impaired balance, impaired endurance, gait instability.  Pt tolerated eval well. She was able to ambulate down hallway with CGA no AD and then participate in standing ADLs. Pt was Ind with ADLs and mobility, does not work but drives, states she will have assistance at home. Recommending Low Intensity Therapy.     Rehab Prognosis: Good; patient would benefit from acute skilled OT services to address these deficits and reach maximum level of function.       Plan:     Patient to be seen 2 x/week to address the above listed problems via therapeutic activities, therapeutic exercises, self-care/home management  Plan of Care Expires: 11/10/24  Plan of Care Reviewed with: patient    Subjective     Chief Complaint: none  Patient/Family Comments/goals: "it feels heavy when I walk"    Occupational Profile:  Living Environment: Lives with spouse in a [facility], threshold entrance  Bathroom- t/s combo   Previous level of function: Ind with ADLs and mobility  Roles and Routines: drives, not working  Equipment Used at Home: none  Assistance upon Discharge: family    Pain/Comfort:  Pain Rating 1: 0/10  Pain Rating Post-Intervention 1: 0/10    Patients cultural, spiritual, Muslim conflicts given the current situation: no    Objective:   Co-evaluation/treatment performed to appropriately and safely assess patient's strength and endurance while facilitating functional tasks in addition to accommodating for patient's activity " tolerance.      Communicated with: RN prior to session.  Patient found HOB elevated with telemetry, peripheral IV, blood pressure cuff, PureWick upon OT entry to room.    General Precautions: Standard, fall  Orthopedic Precautions: N/A  Braces: N/A  Respiratory Status: Room air    Occupational Performance:    Bed Mobility:    Patient completed Scooting/Bridging with stand by assistance  Patient completed Supine to Sit with stand by assistance    Functional Mobility/Transfers:  Patient completed Sit <> Stand Transfer with stand by assistance  with  no assistive device   Functional Mobility: Pt ambulated around room and down hallway with CGA and no AD to simulate home distances and maximize functional endurance required for community mobility.    Activities of Daily Living:  Grooming: stand by assistance oral and facial hygiene standing at the sink. SBA for balance   Lower Body Dressing: supervision adjusting socks seated in chair    Cognitive/Visual Perceptual:  Cognitive/Psychosocial Skills:     -       Oriented to: Person, Place, Time, and Situation   -       Follows Commands/attention:Follows multistep  commands  -       Safety awareness/insight to disability: intact   -       Mood/Affect/Coping skills/emotional control: Appropriate to situation    Physical Exam:  Dominant hand: -       R  Upper Extremity Range of Motion:     -       Right Upper Extremity: WFL  -       Left Upper Extremity: WFL  Upper Extremity Strength:    -       Right Upper Extremity: 4/5  -       Left Upper Extremity: 4-/5 except shld flex and ABD at 3+/5   Strength:    -       Right Upper Extremity: WFL  -       Left Upper Extremity: WFL  Fine Motor Coordination:    -       Intact    AMPAC 6 Click ADL:  AMPAC Total Score: 24    Treatment & Education:  Pt educated on   Role of OT and OT POC  Safe transfer techniques and proper body mechanics for fall prevention and improved independence with functional transfers  Importance of OOB  activities to increase endurance and tolerance for increased participation in daily ADLs    Utilizing the call bell to request for assistance with all functional mobility to ensure safety during hospital stay.    Pt verbalized understanding and all questions were addressed within the scope of OT.     Patient left up in chair with all lines intact, call button in reach, and RN notified    GOALS:   Multidisciplinary Problems       Occupational Therapy Goals          Problem: Occupational Therapy    Goal Priority Disciplines Outcome Interventions   Occupational Therapy Goal     OT, PT/OT Progressing    Description: Goals to be met by: 11/11/24     Patient will increase functional independence with ADLs by performing:    UE Dressing with Motley.  LE Dressing with Motley.  Grooming while standing at sink with Motley.  Toileting from toilet with Motley for hygiene and clothing management.   Toilet transfer to toilet with Motley.                       History:     Past Medical History:   Diagnosis Date    Glaucoma     Hypertension          Past Surgical History:   Procedure Laterality Date    GLAUCOMA SURGERY         Time Tracking:     OT Date of Treatment: 10/11/24  OT Start Time: 0853  OT Stop Time: 0912  OT Total Time (min): 19 min    Billable Minutes:Evaluation 9  Therapeutic Activity 10    10/11/2024

## 2024-10-11 NOTE — PROGRESS NOTES
I saw and evaluated the patient. I have reviewed and agree with the residents findings, including all diagnostic interpretations, and plans as written. This is an attestation of a separate note written by the ICU resident today.  As the teaching physician, I was present for and have confirmed the key portions of the service performed by the resident today.  In addition to the resident's note, I add the followinyo F w/ pmhx who presented to [facility] on 10/10 w/ numbness to the L face, and weakness of the LUE, LLE that began around 1pm. Stroke activated at 2pm. CTH w/o bleed. Decision was made to give TNK at 1506. Admitted to the MICU for post TNK monitoring    Interval Hx:  10/11: MRI w/ lacunar infarct R post thalamus    Problems:  Acute CVA     Plan:  S/p TNK. Continue post TNK protocol. Cardene for BP > 180/110  DVT ppx: SCDs  GI ppx: n/a  Code status: full  Dispo: Floor     45 minutes of critical care time was spent in the critical care management of the patient. This included management of organ failures related to critical illness, titration of continuous infusions, management of mechanical ventilation, review of pertinent labs and imaging studies, discussion of the patient with consulting services, and discussion of the patients condition with the patient and family.      Familia Kahn MD  [facility]

## 2024-10-11 NOTE — PLAN OF CARE
Plan of care reviewed patient verbalized understanding, no acute changes throughout shift, stoke education and booklet provided, fall risk band on, bed alarm on, safety maintained.   Problem: Stroke, Ischemic (Includes Transient Ischemic Attack)  Goal: Optimal Coping  Outcome: Progressing  Goal: Effective Bowel Elimination  Outcome: Progressing  Goal: Optimal Cerebral Tissue Perfusion  Outcome: Progressing  Goal: Optimal Cognitive Function  Outcome: Progressing  Goal: Improved Communication Skills  Outcome: Progressing  Goal: Optimal Functional Ability  Outcome: Progressing  Goal: Optimal Nutrition Intake  Outcome: Progressing  Goal: Effective Oxygenation and Ventilation  Outcome: Progressing  Goal: Improved Sensorimotor Function  Outcome: Progressing  Goal: Safe and Effective Swallow  Outcome: Progressing  Goal: Effective Urinary Elimination  Outcome: Progressing     Problem: Fall Injury Risk  Goal: Absence of Fall and Fall-Related Injury  Outcome: Progressing     Problem: Adult Inpatient Plan of Care  Goal: Plan of Care Review  Outcome: Progressing  Goal: Patient-Specific Goal (Individualized)  Outcome: Progressing  Goal: Absence of Hospital-Acquired Illness or Injury  Outcome: Progressing  Goal: Optimal Comfort and Wellbeing  Outcome: Progressing  Goal: Readiness for Transition of Care  Outcome: Progressing     Problem: Infection  Goal: Absence of Infection Signs and Symptoms  Outcome: Progressing

## 2024-10-11 NOTE — NURSING
Patient arrived to unit. Vital signs stable. Discussed team and staff expectations from team and staff. Room orientation done. Patient with no complaints of pain.  Bed in lowest position, call light in hand, scd's applied, bed alarm set. Patient educated to call for assistance to restroom. Patient verbalized and understanding. Will continue plan of care.

## 2024-10-11 NOTE — ASSESSMENT & PLAN NOTE
Patients blood pressure range in the last 24 hours was: BP  Min: 120/64  Max: 200/101.The patient's inpatient anti-hypertensive regimen is listed below:  Current Antihypertensives  niCARdipine 40 mg/200 mL (0.2 mg/mL) infusion, Continuous, Intravenous  niCARdipine (CARDENE) 40 mg/200 mL (0.2 mg/mL) infusion, ,     Plan  - BP is controlled, no changes needed to their regimen  - keep SBP<180  - low-sodium diet

## 2024-10-11 NOTE — SUBJECTIVE & OBJECTIVE
Past Medical History:   Diagnosis Date    Glaucoma     Hypertension        Past Surgical History:   Procedure Laterality Date    GLAUCOMA SURGERY         Review of patient's allergies indicates:   Allergen Reactions    Codeine     Hydrocodone-acetaminophen Itching     Insomnia^         Current Facility-Administered Medications on File Prior to Encounter   Medication    meclizine tablet 25 mg     Current Outpatient Medications on File Prior to Encounter   Medication Sig    ascorbic acid (VITAMIN C ORAL) Take 1 tablet by mouth once daily.    cetirizine HCl (ZYRTEC ORAL) Take 1 tablet by mouth as needed.    ergocalciferol, vitamin D2, (VITAMIN D ORAL) Take 1 tablet by mouth once daily.    MAGNESIUM ORAL Take 1 tablet by mouth once daily.    multivitamin (ONE DAILY MULTIVITAMIN) per tablet Take 1 tablet by mouth once daily.    POTASSIUM ORAL Take 1 tablet by mouth once daily.    albuterol (PROAIR HFA) 90 mcg/actuation inhaler Inhale 2 puffs into the lungs every 6 (six) hours as needed for Wheezing or Shortness of Breath. Rescue (Patient not taking: Reported on 10/10/2024)    ibuprofen (ADVIL,MOTRIN) 600 MG tablet Take 1 tablet (600 mg total) by mouth every 6 (six) hours as needed for Pain or Temperature greater than (100.4). (Patient not taking: Reported on 10/10/2024)    ipratropium (ATROVENT) 0.03 % nasal spray 2 sprays by Nasal route 2 (two) times daily as needed. (Patient not taking: Reported on 10/10/2024)    lisinopriL (PRINIVIL,ZESTRIL) 20 MG tablet Take 20 mg by mouth. (Patient not taking: Reported on 10/10/2024)    meclizine (ANTIVERT) 25 mg tablet Take 1 tablet (25 mg total) by mouth 3 (three) times daily as needed for Dizziness or Nausea. (Patient not taking: Reported on 10/10/2024)    ondansetron (ZOFRAN-ODT) 4 MG TbDL Take 1 tablet (4 mg total) by mouth every 6 (six) hours as needed. (Patient not taking: Reported on 10/10/2024)    travoprost (TRAVATAN Z) 0.004 % ophthalmic solution Place 1 drop into the  right eye every evening.     Family History    Family history is unknown by patient.       Tobacco Use    Smoking status: Never    Smokeless tobacco: Never   Substance and Sexual Activity    Alcohol use: Yes     Comment: Social    Drug use: Never    Sexual activity: Yes     Review of Systems   Constitutional:  Negative for activity change, chills and fever.   HENT:  Negative for congestion, rhinorrhea and sore throat.    Eyes:  Negative for discharge and redness.   Respiratory:  Negative for cough, chest tightness, shortness of breath and wheezing.    Cardiovascular:  Negative for chest pain, palpitations and leg swelling.   Gastrointestinal:  Negative for abdominal pain, constipation, diarrhea, nausea and vomiting.   Genitourinary:  Negative for dysuria, flank pain and hematuria.   Musculoskeletal:  Negative for back pain, myalgias and neck pain.   Skin:  Negative for pallor, rash and wound.   Neurological:  Positive for weakness, numbness and headaches. Negative for dizziness, tremors and syncope.        Weakness and numbness on left side of face, left upper and lower extremities   Psychiatric/Behavioral:  Negative for agitation, confusion and hallucinations.      Objective:     Vital Signs (Most Recent):  Temp: 98.2 °F (36.8 °C) (10/10/24 1700)  Pulse: 71 (10/10/24 1930)  Resp: 14 (10/10/24 1930)  BP: (!) 165/85 (10/10/24 1930)  SpO2: 97 % (10/10/24 1930) Vital Signs (24h Range):  Temp:  [98.2 °F (36.8 °C)-98.3 °F (36.8 °C)] 98.2 °F (36.8 °C)  Pulse:  [70-98] 71  Resp:  [14-20] 14  SpO2:  [95 %-99 %] 97 %  BP: (120-200)/() 165/85     Weight: 63.5 kg (140 lb)  Body mass index is 27.34 kg/m².     Physical Exam  Vitals and nursing note reviewed.   Constitutional:       General: She is not in acute distress.  HENT:      Head: Normocephalic and atraumatic.      Mouth/Throat:      Mouth: Mucous membranes are moist.   Eyes:      General:         Right eye: No discharge.         Left eye: No discharge.       Conjunctiva/sclera: Conjunctivae normal.   Cardiovascular:      Rate and Rhythm: Normal rate and regular rhythm.      Pulses: Normal pulses.   Pulmonary:      Effort: Pulmonary effort is normal.      Breath sounds: Normal breath sounds.   Abdominal:      General: Bowel sounds are normal.      Palpations: Abdomen is soft.   Musculoskeletal:         General: No swelling. Normal range of motion.      Cervical back: Normal range of motion and neck supple.      Right lower leg: No edema.      Left lower leg: No edema.   Skin:     General: Skin is warm and dry.   Neurological:      Mental Status: She is alert and oriented to person, place, and time. Mental status is at baseline.      Motor: Weakness present.      Comments: Left upper and lower extremity strength 4/5, numbness resolved   Psychiatric:         Mood and Affect: Mood normal.                Significant Labs: All pertinent labs within the past 24 hours have been reviewed.    Significant Imaging: I have reviewed all pertinent imaging results/findings within the past 24 hours.

## 2024-10-11 NOTE — ASSESSMENT & PLAN NOTE
Patient was developed left facial, left upper extremity and left lower extremity weakness this morning.  Symptoms resolved after TNK  Antithrombotics for secondary stroke prevention:  We will start antiplatelets and anticoagulation after 24 hours, due to patient receiving TNK    Statins for secondary stroke prevention and hyperlipidemia, if present:   Statins: Atorvastatin- 40 mg daily    Aggressive risk factor modification: HTN, family history     Rehab efforts: The patient has been evaluated by a stroke team provider and the therapy needs have been fully considered based off the presenting complaints and exam findings. The following therapy evaluations are needed: PT evaluate and treat, OT evaluate and treat, SLP evaluate and treat    Diagnostics ordered/pending: CT scan of head without contrast to asses brain parenchyma, HgbA1C to assess blood glucose levels, Lipid Profile to assess cholesterol levels, MRI head without contrast to assess brain parenchyma, TTE to assess cardiac function/status , TSH to assess thyroid function    VTE prophylaxis: Mechanical prophylaxis: Place SCDs  We will start chemical prophylaxis after 24 hours    BP parameters: Infarct: Post Thrombolytic therapy, SBP <180    -start aspirin and Plavix after 24 hours post TNK  -neurology consult pending    -CT showed: 8 mm sclerotic lesion in the T4 vertebral body.  Patient follow-up with MRI thoracic spine without and with contrast is suggested.    Tele neurology was consulted and recommended:    -IV Tenecteplase 0.25mg/kg IV push (max dose 25mg); If Tenecteplase is not available use Alteplase 0.9mg/kg IV bolus followed by infusion (max dose 90mg)   -BP goal prior to IV thrombolytics - <185/110 - Initiate BP management prior to treatment if not at goal  -BP goal post IV thrombolytics - <180/105 for at least 24 hrs. - Continue BP management to maintain goal     Additional Recommendations:   Neurological assessment and vital signs (except  temperature) every 15 minutes x 2 hours, then every 30 minutes x 6 hours, then every hour x 16 hours..  Frequency of BP assessments may need to be increased if systolic BP stays >= 180 mm Hg or diastolic BP stays >= 105 mm Hg. Administer antihypertensive meds as ordered  Temperature every 4 hours or as required.  Follow hospital protocol for further orders re: post thrombolytic therapy patient management.  No antithrombotics or anticoagulants (including but not limited to: heparin, warfarin, aspirin, clopidigrel, or dipyridamole) for 24 hours, then start antithrombotics as ordered by treating physician

## 2024-10-11 NOTE — PLAN OF CARE
The sw met with the pt to complete the assessment. The pt lives in [place] with Bryce Worley(spouse)[phone],her dtr and 3 grandkids. The pt has a very supportive family. The pt's independent with her ADL's and doesn't use dme. The pt still drives but her spouse will transport her home at d/c. The pt's unemployed and receives all her monetary needs met by her spouse. The sw gave the pt a d/c brochure with her contact info on it. The sw completed the white board in the pt's room with her name and contact info. The sw encouraged her to call if she has any further questions or concerns. The sw will continue to follow the pt throughout her transitions of care and will assist with any d/c needs. The pt follows with Dr. Bon Jenkins in [place]([phone])for her medical needs(PCP).    [place] - Intensive Care  Initial Discharge Assessment       Primary Care Provider: No, Primary Doctor    Admission Diagnosis: Weakness of left upper extremity [R29.898]  Acute focal neurological deficit [R29.818]  Acute CVA (cerebrovascular accident) [I63.9]    Admission Date: 10/10/2024  Expected Discharge Date:     Transition of Care Barriers: (P) Underinsured    Payor: MEDICAID / Plan: [facility] ([facility]) / Product Type: Managed Medicaid /     Extended Emergency Contact Information  Primary Emergency Contact: Bryce Worley  Mobile Phone: [phone]  Relation: Spouse   needed? Yes    Discharge Plan A: (P) Home with family  Discharge Plan B: (P) Home Health      [id number] [place] - [address] AT [facility]  [address] [address]  Phone: [phone] Fax: [phone]      Initial Assessment (most recent)       Adult Discharge Assessment - 10/11/24 0832          Discharge Assessment    Assessment Type Discharge Planning Assessment (P)      Confirmed/corrected address, phone number and insurance Yes (P)      Confirmed Demographics Correct on  Facesheet (P)      Source of Information patient (P)      When was your last doctors appointment? -- (P)    April 2024    Communicated ALEC with patient/caregiver Yes (P)      Reason For Admission Acute ischemic right PCS stroke (P)      People in Home child(melissa), adult;grandchild(melissa);spouse (P)      Do you expect to return to your current living situation? Yes (P)      Do you have help at home or someone to help you manage your care at home? Yes (P)      Who are your caregiver(s) and their phone number(s)? Bryce Worley(spouse)[phone] (P)      Prior to hospitilization cognitive status: Alert/Oriented (P)      Current cognitive status: Alert/Oriented (P)      Walking or Climbing Stairs Difficulty no (P)      Dressing/Bathing Difficulty no (P)      Home Accessibility wheelchair accessible (P)      Home Layout Able to live on 1st floor (P)      Equipment Currently Used at Home none (P)      Readmission within 30 days? No (P)      Patient currently being followed by outpatient case management? No (P)      Do you currently have service(s) that help you manage your care at home? No (P)      Do you have prescription coverage? Yes (P)      Coverage Medicaid [employer] (P)      Do you have any problems affording any of your prescribed medications? No (P)      Is the patient taking medications as prescribed? yes (P)      Who is going to help you get home at discharge? Bryce Worley(spouse)[phone] (P)      How do you get to doctors appointments? car, drives self (P)      Are you on dialysis? No (P)      Do you take coumadin? No (P)      Discharge Plan A Home with family (P)      Discharge Plan B Home Health (P)      DME Needed Upon Discharge  none (P)      Discharge Plan discussed with: Patient (P)      Transition of Care Barriers Underinsured (P)         Physical Activity    On average, how many days per week do you engage in moderate to strenuous exercise (like a brisk walk)? 3 days (P)      On average, how  many minutes do you engage in exercise at this level? 50 min (P)         Financial Resource Strain    How hard is it for you to pay for the very basics like food, housing, medical care, and heating? Not very hard (P)         Housing Stability    In the last 12 months, was there a time when you were not able to pay the mortgage or rent on time? No (P)      At any time in the past 12 months, were you homeless or living in a shelter (including now)? No (P)         Transportation Needs    Has the lack of transportation kept you from medical appointments, meetings, work or from getting things needed for daily living? No (P)         Food Insecurity    Within the past 12 months, you worried that your food would run out before you got the money to buy more. Never true (P)      Within the past 12 months, the food you bought just didn't last and you didn't have money to get more. Never true (P)         Stress    Do you feel stress - tense, restless, nervous, or anxious, or unable to sleep at night because your mind is troubled all the time - these days? Rather much (P)         Social Isolation    How often do you feel lonely or isolated from those around you?  Never (P)         Alcohol Use    Q1: How often do you have a drink containing alcohol? Monthly or less (P)      Q2: How many drinks containing alcohol do you have on a typical day when you are drinking? 1 or 2 (P)      Q3: How often do you have six or more drinks on one occasion? Never (P)         Utilities    In the past 12 months has the electric, gas, oil, or water company threatened to shut off services in your home? No (P)         Health Literacy    How often do you need to have someone help you when you read instructions, pamphlets, or other written material from your doctor or pharmacy? Never (P)         OTHER    Name(s) of People in Home Bryce Worley(spouse)[phone],pt's dtr and her 3 grandkids (P)

## 2024-10-11 NOTE — NURSING
Transferred to room 427 telemetry via WC and cardiac monitoring. Patient belonging at the bedside. Safety maintained bed in the lowest position, call bell within reach, bed alarm on.

## 2024-10-11 NOTE — PLAN OF CARE
Problem: Occupational Therapy  Goal: Occupational Therapy Goal  Description: Goals to be met by: 11/11/24     Patient will increase functional independence with ADLs by performing:    UE Dressing with [place].  LE Dressing with [place].  Grooming while standing at sink with [place].  Toileting from toilet with [place] for hygiene and clothing management.   Toilet transfer to toilet with [place].  Outcome: Progressing     Pt tolerated eval well. She was able to ambulate down hallway with CGA no AD and then participate in standing ADLs. Pt was Ind with ADLs and mobility, does not work but drives, states she will have assistance at home. Recommending Low Intensity Therapy.

## 2024-10-11 NOTE — CONSULTS
[facility] Consult Note    Primary Attending Physician: Dr. Tena  Consultant Attending: Dr. Kahn  Consultant Fellow: Dr. Fields    Reason for Consult:     Acute Ischemic Right PCA Stroke s/p TNK    Subjective:      History of Present Illness:  Ms. Agnes Worley is a 59 year old female with a PMHx of HTN who presented to the ED for left sided weakness and numbness.  She states she woke up on 10/10 with a severe headache.  Noticed left sided numbness which went away.  Around 1pm, she states she developed left sided numbness and left sided weakness of her face and UE.  She went to the ED where she was stroke activated.  CT Head without concern for intracranial bleed.  Was given TNK at 15:06 on 10/10.  Due to elevated BP, given nicardipine drip.  Brought to ICU s/p TNK on nicardipine drip.    Past Medical History:  Past Medical History:   Diagnosis Date    Glaucoma     Hypertension        Past Surgical History:  Past Surgical History:   Procedure Laterality Date    GLAUCOMA SURGERY         Allergies:  Review of patient's allergies indicates:   Allergen Reactions    Codeine     Hydrocodone-acetaminophen Itching     Insomnia^         Medications:   In-Hospital Scheduled Medications:   atorvastatin  40 mg Oral Daily      In-Hospital PRN Medications:    Current Facility-Administered Medications:     bisacodyL, 10 mg, Rectal, Daily PRN    labetalol, 10 mg, Intravenous, Q4H PRN    ondansetron, 4 mg, Intravenous, Q12H PRN    sodium chloride 0.9%, 500 mL, Intravenous, PRN    sodium chloride 0.9%, 10 mL, Intravenous, PRN   In-Hospital IV Infusion Medications:     Home Medications:  Prior to Admission medications    Medication Sig Start Date End Date Taking? Authorizing Provider   ascorbic acid (VITAMIN C ORAL) Take 1 tablet by mouth once daily.   Yes Provider, Historical   cetirizine HCl (ZYRTEC ORAL) Take 1 tablet by mouth as needed.   Yes Provider, Historical   ergocalciferol,  vitamin D2, (VITAMIN D ORAL) Take 1 tablet by mouth once daily.   Yes Provider, Historical   MAGNESIUM ORAL Take 1 tablet by mouth once daily.   Yes Provider, Historical   multivitamin (ONE DAILY MULTIVITAMIN) per tablet Take 1 tablet by mouth once daily.   Yes Provider, Historical   POTASSIUM ORAL Take 1 tablet by mouth once daily.   Yes Provider, Historical   travoprost (TRAVATAN Z) 0.004 % ophthalmic solution Place 1 drop into the right eye every evening. 11/25/13  Yes Provider, Historical   albuterol (PROAIR HFA) 90 mcg/actuation inhaler Inhale 2 puffs into the lungs every 6 (six) hours as needed for Wheezing or Shortness of Breath. Rescue  Patient not taking: Reported on 10/10/2024 7/3/20 7/3/21  Francisco Cano PA-C   ibuprofen (ADVIL,MOTRIN) 600 MG tablet Take 1 tablet (600 mg total) by mouth every 6 (six) hours as needed for Pain or Temperature greater than (100.4).  Patient not taking: Reported on 10/10/2024 12/15/21   Tiffany Ling MD   ipratropium (ATROVENT) 0.03 % nasal spray 2 sprays by Nasal route 2 (two) times daily as needed.  Patient not taking: Reported on 10/10/2024 6/29/20   Francisco Caon PA-C   lisinopriL (PRINIVIL,ZESTRIL) 20 MG tablet Take 20 mg by mouth.  Patient not taking: Reported on 10/10/2024    Provider, Historical   meclizine (ANTIVERT) 25 mg tablet Take 1 tablet (25 mg total) by mouth 3 (three) times daily as needed for Dizziness or Nausea.  Patient not taking: Reported on 10/10/2024 6/29/20   Francisco Cano PA-C   ondansetron (ZOFRAN-ODT) 4 MG TbDL Take 1 tablet (4 mg total) by mouth every 6 (six) hours as needed.  Patient not taking: Reported on 10/10/2024 12/15/21   Tiffany Ling MD       Family History:  Family History   Family history unknown: Yes       Social History:  Social History     Tobacco Use    Smoking status: Never    Smokeless tobacco: Never   Substance Use Topics    Alcohol use: Yes     Comment: Social    Drug use: Never       Review of  Systems:  All other systems are reviewed and are negative.     Objective:   Last 24 Hour Vital Signs:  BP  Min: 100/54  Max: 200/101  Temp  Av.1 °F (36.7 °C)  Min: 97.6 °F (36.4 °C)  Max: 98.5 °F (36.9 °C)  Pulse  Av.8  Min: 56  Max: 98  Resp  Av.5  Min: 11  Max: 45  SpO2  Av.2 %  Min: 93 %  Max: 99 %  Height  Av' (152.4 cm)  Min: 5' (152.4 cm)  Max: 5' (152.4 cm)  Weight  Av.3 kg (141 lb 10.4 oz)  Min: 63.5 kg (140 lb)  Max: 65 kg (143 lb 4.8 oz)  I/O last 3 completed shifts:  In: -   Out: 1200 [Urine:1200]    Physical Examination:  General:  resting comfortably in bed, eating breakfast  Cardiovascular:  regular rate and rhythm, no murmurs appreciated  Respiratory:  nonlabored breathing on RA, lungs CTAB  Abdomen:  soft, non-distended, nontender to palpation  Extremities:  no edema noted bilaterally  Skin: warm, dry, intact  Neurologic:  4/5 strength in LUE, 5/5 strength in other extremities, sensation intact equally bilaterally, cranial nerves intact, coordination intact, able to talk and eat without difficulty     Laboratory:  Trended Lab Data:  Recent Labs     10/10/24  1435 10/10/24  1436 10/11/24  0501   WBC 6.81  --  8.44   HGB 13.3  --  12.3   HCT 40.4  --  36.7*     --  227     --  138   K 3.7  --  3.6     --  108   CO2 24  --  22*   BUN 12  --  15   CREATININE 0.8  --  0.7   GLU 84  --  99   BILITOT 0.4  --  0.4   AST 15  --  15   ALT 13  --  11   ALKPHOS 63  --  63   CALCIUM 8.8  --  8.7   ALBUMIN 3.4*  --  3.4*   PROT 6.4  --  6.3   MG  --   --  2.0   PHOS  --   --  4.4   INR 1.0 1.1 1.0       Cardiac:   Recent Labs   Lab 10/10/24  1435 10/11/24  0501   TROPONINI <0.006 <0.006       Urinalysis:   Lab Results   Component Value Date    COLORU Yellow 2023    SPECGRAV 1.015 2023    NITRITE Negative 2023    KETONESU Negative 2023    UROBILINOGEN Negative 2023       Microbiology:  Microbiology Results (last 7 days)       ** No  results found for the last 168 hours. **            Radiology:  - CTA Head/Neck:  no large vessel occlusion intracranial; no significant stenosis of neck vessels, atherosclerotic plaque at origin of L subclavian artery  - MRA Brain:  stable vascular distribution with isolate focal high-grade stenosis of P1 P2 junction on right  - MRI Brain: small lacunar infarct posterior R thalamus   - DVT US: negative     Assessment:     Ms. Agnes Worley is a 59 year old female with a PMHx of HTN who presented to the ED for left sided weakness and numbness.  She was given TNK with significant symptom relief.  Imaging showed a small lacunar infarct in the posterior right thalamus and high-grade stenosis of right P1 P2 junction.  She was given nicardipine drip last night, but does not require now.  She will stay in ICU for 24 hours post-TNK and can be stepped down to floor on DAPT pending normal repeat CT Head.     Plan:     Neurologic     Acute Ischemic Right PCA Stroke  - presented with symptoms of Left sided numbness and Left sided weakness of face and UE that started 13:00 on 10/10  - CT Head negative for intracranial bleed  - s/p TNK at 15:06 on 10/10  - CTA Head/Neck:  no large vessel occlusion intracranial; no significant stenosis of neck vessels, atherosclerotic plaque at origin of L subclavian artery  - MRA Brain:  stable vascular distribution with isolate focal high-grade stenosis of P1 P2 junction on right  - MRI Brain: small lacunar infarct posterior R thalamus   - DVT US: negative  - states symptom improvement with residual symptoms of left weakness in arm  - exam with 4/5 strength LUE, 5/5 other extremities, sensation equal bilaterally, no facial droop  Plan:  - repeat CT Head 24 hours post-TNK  - start DAPT with ASA and Plavix 24 hours after TNK (3pm today) if normal CT head  - start high intensity statin  - PT/OT evaluation  - BP control <180/105  - frequent q2h neuro checks for first 24 hours      T4 Vertebral  Body Sclerotic Lesion  - CTA showed incidental finding of 8mm sclerotic lesion  - pending MRI thoracic spine         Cardiovascular     Hypertension  - arrived with systolics in 190s  - TNK given when BP below 185/110  - admitted to ICU on continuous nicardipine infusion, which was discontinued overnight  - goal below BP < 180/105 for 24 hours after TNK  - now normotensive        Pulmonary  - no concerns, on RA       Gastrointestinal  - SLP consulted  - was tolerating her diet this morning  - cardiac diet       Renal  - no renal concerns  - Cr at baseline of 0.7       Hematology  - SCDs for DVT ppx now  - can add DVT ppx 24 hours post TNK        Endocrine  - A1C of 5.3%  - TSH 3.14       Infectious Disease    - no concerns for infection      ICU Checklist  Feeding: cardiac diet  Analgesia: none  Sedation: none  Thrombo PPX:  hold for 24 hours post TNK  Head of Bed: >30 degrees  Ulcer (Stress) PPX: not indicated  Glucose: goal 140-180  SBT/SAT: N/A  Bowel Regimen: bisacodyl   Indwelling catheter/Lines/Invasive devices:  De-escalation ABX: no Abx    CODE STATUS: FULL  DISPO:  can step down from ICU following 24 hours post-TNK      Jonathon Dear, DO  [employer] [facility], PGY-1

## 2024-10-11 NOTE — PHARMACY MED REC
"    [facility]           Pharmacy  Admission Medication History     The home medication history was taken by Cheryl Hardy.      Medication history obtained from Medications listed below were obtained from: Patient/family    Based on information gathered for medication list, you may go to "Admission" then "Reconcile Home Medications" tabs to review and/or act upon those items.     The home medication list has been updated by the Pharmacy department.   Please read ALL comments highlighted in yellow.   Please address this information as you see fit.    Feel free to contact us if you have any questions or require assistance.        Current Facility-Administered Medications on File Prior to Encounter   Medication Dose Route Frequency Provider Last Rate Last Admin    meclizine tablet 25 mg  25 mg Oral 1 time in Clinic/HOD Francisco Cano PA-C         Current Outpatient Medications on File Prior to Encounter   Medication Sig Dispense Refill    ascorbic acid (VITAMIN C ORAL) Take 1 tablet by mouth once daily.      cetirizine HCl (ZYRTEC ORAL) Take 1 tablet by mouth as needed.      ergocalciferol, vitamin D2, (VITAMIN D ORAL) Take 1 tablet by mouth once daily.      MAGNESIUM ORAL Take 1 tablet by mouth once daily.      multivitamin (ONE DAILY MULTIVITAMIN) per tablet Take 1 tablet by mouth once daily.      POTASSIUM ORAL Take 1 tablet by mouth once daily.      travoprost (TRAVATAN Z) 0.004 % ophthalmic solution Place 1 drop into the right eye every evening.         Please address this information as you see fit.  Feel free to contact us if you have any questions or require assistance.    Cheryl Hardy  [phone]              .          "

## 2024-10-11 NOTE — H&P
[facility] Emergency [facility] Medicine  History & Physical    Patient Name: Agnes Worley  MRN: [id number]  Patient Class: IP- Inpatient  Admission Date: 10/10/2024  Attending Physician: Guille Villarreal MD   Primary Care Provider: [facility] Primary Doctor         Patient information was obtained from patient, spouse/SO, and ER records.     Subjective:     Principal Problem:Acute ischemic right PCA stroke    Chief Complaint:   Chief Complaint   Patient presents with    Numbness     Pt reports left sided facial numbness that radiated down left arm that started at 1000 today. Pt reports symptoms resolved and then returned. Pt took 2 ASA.    Left sided weakness        HPI: 59-year-old female with a past medical history of hypertension came to emergency department because of left-sided facial, left-sided upper extremity weakness and numbness.  The patient was also states she has a family history of strokes.  Patient was states she had headache for most of yesterday, it was concentrated in the front of her forehead and rated 10/10.  This morning she woke up around 6:00 a.m. with a continued headache, went back to bed around 8:00 a.m. and woke up around 945 to speak with her friend on the phone.  While on the phone she noticed tingling and numbing sensation on the left side of her lip and left hand.  She also mentioned it was taking longer for her to process information.  She finished over conversations, completed her house chores in the around 1:00 p.m. was speaking with her sister when she noticed the same symptoms again.  This time the numbness had worsened as well as the weakness.  She had these symptoms on the same left side of her face, upper and lower extremities.  The patient was stated she took 2 aspirins and called her  to inform him of her symptoms.  Her family decided to bring her to the emergency department.    CT head was done in the emergency department and showed no interval developing  infarct especially in the distribution of the right PCA territory.  CTA head and neck showed CT scan of the head: Subtle loss of the gray-white differentiation in the right subinsular cortex.  MRI of the brain may be obtained for confirmation. Old lacunar type infarct in the right thalamus. No evidence of intracranial hemorrhage. CTA brain: No large vessel occlusion in the intracranial circulation. High-grade narrowing the proximal aspect of the right P2.  CTA neck: No hemodynamically significant stenosis of the neck vessels. Atherosclerotic plaque at the origin of the left subclavian artery. 8 mm sclerotic lesion in the T4 vertebral body.  Patient follow-up with MRI thoracic spine without and with contrast is suggested.      Tele neurology was consulted and recommended:    -IV Tenecteplase 0.25mg/kg IV push (max dose 25mg); If Tenecteplase is not available use Alteplase 0.9mg/kg IV bolus followed by infusion (max dose 90mg)   -BP goal prior to IV thrombolytics - <185/110 - Initiate BP management prior to treatment if not at goal  -BP goal post IV thrombolytics - <180/105 for at least 24 hrs. - Continue BP management to maintain goal    On physical examination, numbness on the left side of her face has been resolved.  She was regained most of the strength in her left upper extremity as well as left lower extremity.  Patient was to be admitted for acute stroke workup.        Past Medical History:   Diagnosis Date    Glaucoma     Hypertension        Past Surgical History:   Procedure Laterality Date    GLAUCOMA SURGERY         Review of patient's allergies indicates:   Allergen Reactions    Codeine     Hydrocodone-acetaminophen Itching     Insomnia^         Current Facility-Administered Medications on File Prior to Encounter   Medication    meclizine tablet 25 mg     Current Outpatient Medications on File Prior to Encounter   Medication Sig    ascorbic acid (VITAMIN C ORAL) Take 1 tablet by mouth once daily.     cetirizine HCl (ZYRTEC ORAL) Take 1 tablet by mouth as needed.    ergocalciferol, vitamin D2, (VITAMIN D ORAL) Take 1 tablet by mouth once daily.    MAGNESIUM ORAL Take 1 tablet by mouth once daily.    multivitamin (ONE DAILY MULTIVITAMIN) per tablet Take 1 tablet by mouth once daily.    POTASSIUM ORAL Take 1 tablet by mouth once daily.    albuterol (PROAIR HFA) 90 mcg/actuation inhaler Inhale 2 puffs into the lungs every 6 (six) hours as needed for Wheezing or Shortness of Breath. Rescue (Patient not taking: Reported on 10/10/2024)    ibuprofen (ADVIL,MOTRIN) 600 MG tablet Take 1 tablet (600 mg total) by mouth every 6 (six) hours as needed for Pain or Temperature greater than (100.4). (Patient not taking: Reported on 10/10/2024)    ipratropium (ATROVENT) 0.03 % nasal spray 2 sprays by Nasal route 2 (two) times daily as needed. (Patient not taking: Reported on 10/10/2024)    lisinopriL (PRINIVIL,ZESTRIL) 20 MG tablet Take 20 mg by mouth. (Patient not taking: Reported on 10/10/2024)    meclizine (ANTIVERT) 25 mg tablet Take 1 tablet (25 mg total) by mouth 3 (three) times daily as needed for Dizziness or Nausea. (Patient not taking: Reported on 10/10/2024)    ondansetron (ZOFRAN-ODT) 4 MG TbDL Take 1 tablet (4 mg total) by mouth every 6 (six) hours as needed. (Patient not taking: Reported on 10/10/2024)    travoprost (TRAVATAN Z) 0.004 % ophthalmic solution Place 1 drop into the right eye every evening.     Family History    Family history is unknown by patient.       Tobacco Use    Smoking status: Never    Smokeless tobacco: Never   Substance and Sexual Activity    Alcohol use: Yes     Comment: Social    Drug use: Never    Sexual activity: Yes     Review of Systems   Constitutional:  Negative for activity change, chills and fever.   HENT:  Negative for congestion, rhinorrhea and sore throat.    Eyes:  Negative for discharge and redness.   Respiratory:  Negative for cough, chest tightness, shortness of breath and  wheezing.    Cardiovascular:  Negative for chest pain, palpitations and leg swelling.   Gastrointestinal:  Negative for abdominal pain, constipation, diarrhea, nausea and vomiting.   Genitourinary:  Negative for dysuria, flank pain and hematuria.   Musculoskeletal:  Negative for back pain, myalgias and neck pain.   Skin:  Negative for pallor, rash and wound.   Neurological:  Positive for weakness, numbness and headaches. Negative for dizziness, tremors and syncope.        Weakness and numbness on left side of face, left upper and lower extremities   Psychiatric/Behavioral:  Negative for agitation, confusion and hallucinations.      Objective:     Vital Signs (Most Recent):  Temp: 98.2 °F (36.8 °C) (10/10/24 1700)  Pulse: 71 (10/10/24 1930)  Resp: 14 (10/10/24 1930)  BP: (!) 165/85 (10/10/24 1930)  SpO2: 97 % (10/10/24 1930) Vital Signs (24h Range):  Temp:  [98.2 °F (36.8 °C)-98.3 °F (36.8 °C)] 98.2 °F (36.8 °C)  Pulse:  [70-98] 71  Resp:  [14-20] 14  SpO2:  [95 %-99 %] 97 %  BP: (120-200)/() 165/85     Weight: 63.5 kg (140 lb)  Body mass index is 27.34 kg/m².     Physical Exam  Vitals and nursing note reviewed.   Constitutional:       General: She is not in acute distress.  HENT:      Head: Normocephalic and atraumatic.      Mouth/Throat:      Mouth: Mucous membranes are moist.   Eyes:      General:         Right eye: No discharge.         Left eye: No discharge.      Conjunctiva/sclera: Conjunctivae normal.   Cardiovascular:      Rate and Rhythm: Normal rate and regular rhythm.      Pulses: Normal pulses.   Pulmonary:      Effort: Pulmonary effort is normal.      Breath sounds: Normal breath sounds.   Abdominal:      General: Bowel sounds are normal.      Palpations: Abdomen is soft.   Musculoskeletal:         General: No swelling. Normal range of motion.      Cervical back: Normal range of motion and neck supple.      Right lower leg: No edema.      Left lower leg: No edema.   Skin:     General: Skin is warm  and dry.   Neurological:      Mental Status: She is alert and oriented to person, place, and time. Mental status is at baseline.      Motor: Weakness present.      Comments: Left upper and lower extremity strength 4/5, numbness resolved   Psychiatric:         Mood and Affect: Mood normal.                Significant Labs: All pertinent labs within the past 24 hours have been reviewed.    Significant Imaging: I have reviewed all pertinent imaging results/findings within the past 24 hours.  Assessment/Plan:     * Acute ischemic right PCA stroke  Patient was developed left facial, left upper extremity and left lower extremity weakness this morning.  Symptoms resolved after TNK  Antithrombotics for secondary stroke prevention:  We will start antiplatelets and anticoagulation after 24 hours, due to patient receiving TNK    Statins for secondary stroke prevention and hyperlipidemia, if present:   Statins: Atorvastatin- 40 mg daily    Aggressive risk factor modification: HTN, family history     Rehab efforts: The patient has been evaluated by a stroke team provider and the therapy needs have been fully considered based off the presenting complaints and exam findings. The following therapy evaluations are needed: PT evaluate and treat, OT evaluate and treat, SLP evaluate and treat    Diagnostics ordered/pending: CT scan of head without contrast to asses brain parenchyma, HgbA1C to assess blood glucose levels, Lipid Profile to assess cholesterol levels, MRI head without contrast to assess brain parenchyma, TTE to assess cardiac function/status , TSH to assess thyroid function    VTE prophylaxis: Mechanical prophylaxis: Place SCDs  We will start chemical prophylaxis after 24 hours    BP parameters: Infarct: Post Thrombolytic therapy, SBP <180    -start aspirin and Plavix after 24 hours post TNK  -neurology consult pending    -CT showed: 8 mm sclerotic lesion in the T4 vertebral body.  Patient follow-up with MRI thoracic spine  without and with contrast is suggested.    Tele neurology was consulted and recommended:    -IV Tenecteplase 0.25mg/kg IV push (max dose 25mg); If Tenecteplase is not available use Alteplase 0.9mg/kg IV bolus followed by infusion (max dose 90mg)   -BP goal prior to IV thrombolytics - <185/110 - Initiate BP management prior to treatment if not at goal  -BP goal post IV thrombolytics - <180/105 for at least 24 hrs. - Continue BP management to maintain goal     Additional Recommendations:   Neurological assessment and vital signs (except temperature) every 15 minutes x 2 hours, then every 30 minutes x 6 hours, then every hour x 16 hours..  Frequency of BP assessments may need to be increased if systolic BP stays >= 180 mm Hg or diastolic BP stays >= 105 mm Hg. Administer antihypertensive meds as ordered  Temperature every 4 hours or as required.  Follow hospital protocol for further orders re: post thrombolytic therapy patient management.  No antithrombotics or anticoagulants (including but not limited to: heparin, warfarin, aspirin, clopidigrel, or dipyridamole) for 24 hours, then start antithrombotics as ordered by treating physician    Essential hypertension  Patients blood pressure range in the last 24 hours was: BP  Min: 120/64  Max: 200/101.The patient's inpatient anti-hypertensive regimen is listed below:  Current Antihypertensives  niCARdipine 40 mg/200 mL (0.2 mg/mL) infusion, Continuous, Intravenous  niCARdipine (CARDENE) 40 mg/200 mL (0.2 mg/mL) infusion, ,     Plan  - BP is controlled, no changes needed to their regimen  - keep SBP<180  - low-sodium diet      VTE Risk Mitigation (From admission, onward)           Ordered     Reason for No Pharmacological VTE Prophylaxis  Once        Comments: No anticoag for 24hrs   Question:  Reasons:  Answer:  Physician Provided (leave comment)  Comment:  s/p TNK    10/10/24 1919     IP VTE HIGH RISK PATIENT  Once         10/10/24 1919     Place sequential compression  device  Until discontinued         10/10/24 1919                                    Stanton Lutz MD  Department of Hospital Medicine  [facility] - Emergency Dept

## 2024-10-12 VITALS
WEIGHT: 138.25 LBS | RESPIRATION RATE: 18 BRPM | HEART RATE: 69 BPM | TEMPERATURE: 98 F | SYSTOLIC BLOOD PRESSURE: 163 MMHG | OXYGEN SATURATION: 98 % | BODY MASS INDEX: 27.14 KG/M2 | DIASTOLIC BLOOD PRESSURE: 68 MMHG | HEIGHT: 60 IN

## 2024-10-12 LAB
ALBUMIN SERPL BCP-MCNC: 3.4 G/DL (ref 3.5–5.2)
ALP SERPL-CCNC: 64 U/L (ref 55–135)
ALT SERPL W/O P-5'-P-CCNC: 12 U/L (ref 10–44)
ANION GAP SERPL CALC-SCNC: 5 MMOL/L (ref 8–16)
AST SERPL-CCNC: 15 U/L (ref 10–40)
BASOPHILS # BLD AUTO: 0.03 K/UL (ref 0–0.2)
BASOPHILS NFR BLD: 0.4 % (ref 0–1.9)
BILIRUB SERPL-MCNC: 0.2 MG/DL (ref 0.1–1)
BUN SERPL-MCNC: 17 MG/DL (ref 6–20)
CALCIUM SERPL-MCNC: 8.6 MG/DL (ref 8.7–10.5)
CHLORIDE SERPL-SCNC: 111 MMOL/L (ref 95–110)
CO2 SERPL-SCNC: 24 MMOL/L (ref 23–29)
CREAT SERPL-MCNC: 0.8 MG/DL (ref 0.5–1.4)
DIFFERENTIAL METHOD BLD: NORMAL
EOSINOPHIL # BLD AUTO: 0.4 K/UL (ref 0–0.5)
EOSINOPHIL NFR BLD: 4.5 % (ref 0–8)
ERYTHROCYTE [DISTWIDTH] IN BLOOD BY AUTOMATED COUNT: 12.3 % (ref 11.5–14.5)
EST. GFR  (NO RACE VARIABLE): >60 ML/MIN/1.73 M^2
GLUCOSE SERPL-MCNC: 97 MG/DL (ref 70–110)
HCT VFR BLD AUTO: 38.9 % (ref 37–48.5)
HGB BLD-MCNC: 12.8 G/DL (ref 12–16)
IMM GRANULOCYTES # BLD AUTO: 0.02 K/UL (ref 0–0.04)
IMM GRANULOCYTES NFR BLD AUTO: 0.3 % (ref 0–0.5)
LYMPHOCYTES # BLD AUTO: 3.2 K/UL (ref 1–4.8)
LYMPHOCYTES NFR BLD: 40.6 % (ref 18–48)
MCH RBC QN AUTO: 28.1 PG (ref 27–31)
MCHC RBC AUTO-ENTMCNC: 32.9 G/DL (ref 32–36)
MCV RBC AUTO: 86 FL (ref 82–98)
MONOCYTES # BLD AUTO: 0.6 K/UL (ref 0.3–1)
MONOCYTES NFR BLD: 7.5 % (ref 4–15)
NEUTROPHILS # BLD AUTO: 3.7 K/UL (ref 1.8–7.7)
NEUTROPHILS NFR BLD: 46.7 % (ref 38–73)
NRBC BLD-RTO: 0 /100 WBC
PLATELET # BLD AUTO: 223 K/UL (ref 150–450)
PMV BLD AUTO: 9.6 FL (ref 9.2–12.9)
POCT GLUCOSE: 94 MG/DL (ref 70–110)
POTASSIUM SERPL-SCNC: 4.2 MMOL/L (ref 3.5–5.1)
PROT SERPL-MCNC: 6.4 G/DL (ref 6–8.4)
RBC # BLD AUTO: 4.55 M/UL (ref 4–5.4)
SODIUM SERPL-SCNC: 140 MMOL/L (ref 136–145)
WBC # BLD AUTO: 7.99 K/UL (ref 3.9–12.7)

## 2024-10-12 PROCEDURE — 25000003 PHARM REV CODE 250

## 2024-10-12 PROCEDURE — 85025 COMPLETE CBC W/AUTO DIFF WBC: CPT | Performed by: INTERNAL MEDICINE

## 2024-10-12 PROCEDURE — 99900035 HC TECH TIME PER 15 MIN (STAT)

## 2024-10-12 PROCEDURE — 97116 GAIT TRAINING THERAPY: CPT | Mod: CQ

## 2024-10-12 PROCEDURE — 97110 THERAPEUTIC EXERCISES: CPT | Mod: CQ

## 2024-10-12 PROCEDURE — 99222 1ST HOSP IP/OBS MODERATE 55: CPT | Mod: ,,, | Performed by: INTERNAL MEDICINE

## 2024-10-12 PROCEDURE — 94761 N-INVAS EAR/PLS OXIMETRY MLT: CPT

## 2024-10-12 PROCEDURE — 36415 COLL VENOUS BLD VENIPUNCTURE: CPT | Performed by: INTERNAL MEDICINE

## 2024-10-12 PROCEDURE — 25000003 PHARM REV CODE 250: Performed by: INTERNAL MEDICINE

## 2024-10-12 PROCEDURE — 80053 COMPREHEN METABOLIC PANEL: CPT | Performed by: INTERNAL MEDICINE

## 2024-10-12 RX ORDER — ATORVASTATIN CALCIUM 40 MG/1
40 TABLET, FILM COATED ORAL DAILY
Qty: 90 TABLET | Refills: 3 | Status: SHIPPED | OUTPATIENT
Start: 2024-10-13 | End: 2025-10-13

## 2024-10-12 RX ORDER — NAPROXEN SODIUM 220 MG/1
81 TABLET, FILM COATED ORAL DAILY
Qty: 30 TABLET | Refills: 14 | Status: SHIPPED | OUTPATIENT
Start: 2024-10-13 | End: 2025-12-22

## 2024-10-12 RX ORDER — CLOPIDOGREL BISULFATE 75 MG/1
75 TABLET ORAL DAILY
Qty: 30 TABLET | Refills: 3 | Status: SHIPPED | OUTPATIENT
Start: 2024-10-13 | End: 2025-10-13

## 2024-10-12 RX ADMIN — ATORVASTATIN CALCIUM 40 MG: 40 TABLET, FILM COATED ORAL at 10:10

## 2024-10-12 RX ADMIN — METOPROLOL SUCCINATE 12.5 MG: 25 TABLET, EXTENDED RELEASE ORAL at 10:10

## 2024-10-12 RX ADMIN — ASPIRIN 81 MG CHEWABLE TABLET 81 MG: 81 TABLET CHEWABLE at 10:10

## 2024-10-12 RX ADMIN — CLOPIDOGREL BISULFATE 75 MG: 75 TABLET ORAL at 10:10

## 2024-10-12 NOTE — PROGRESS NOTES
[facility]  Progress Note    Patient Name: Agnes Worley  MRN: [id number]  Patient Class: IP- Inpatient   Admission Date: 10/10/2024  Length of Stay: 2 days  Attending Physician: Gaurav Tena MD  Primary Care Provider: Bon Jenkins MD        Subjective:     Principal Problem:Acute ischemic right PCA stroke        HPI:  59-year-old female with a past medical history of hypertension came to emergency department because of left-sided facial, left-sided upper extremity weakness and numbness.  The patient was also states she has a family history of strokes.  Patient was states she had headache for most of yesterday, it was concentrated in the front of her forehead and rated 10/10.  This morning she woke up around 6:00 a.m. with a continued headache, went back to bed around 8:00 a.m. and woke up around 945 to speak with her friend on the phone.  While on the phone she noticed tingling and numbing sensation on the left side of her lip and left hand.  She also mentioned it was taking longer for her to process information.  She finished over conversations, completed her house chores in the around 1:00 p.m. was speaking with her sister when she noticed the same symptoms again.  This time the numbness had worsened as well as the weakness.  She had these symptoms on the same left side of her face, upper and lower extremities.  The patient was stated she took 2 aspirins and called her  to inform him of her symptoms.  Her family decided to bring her to the emergency department.    CT head was done in the emergency department and showed no interval developing infarct especially in the distribution of the right PCA territory.  CTA head and neck showed CT scan of the head: Subtle loss of the gray-white differentiation in the right subinsular cortex.  MRI of the brain may be obtained for confirmation. Old lacunar type infarct in the right thalamus. No evidence of intracranial  hemorrhage. CTA brain: No large vessel occlusion in the intracranial circulation. High-grade narrowing the proximal aspect of the right P2.  CTA neck: No hemodynamically significant stenosis of the neck vessels. Atherosclerotic plaque at the origin of the left subclavian artery. 8 mm sclerotic lesion in the T4 vertebral body.  Patient follow-up with MRI thoracic spine without and with contrast is suggested.      Tele neurology was consulted and recommended:    -IV Tenecteplase 0.25mg/kg IV push (max dose 25mg); If Tenecteplase is not available use Alteplase 0.9mg/kg IV bolus followed by infusion (max dose 90mg)   -BP goal prior to IV thrombolytics - <185/110 - Initiate BP management prior to treatment if not at goal  -BP goal post IV thrombolytics - <180/105 for at least 24 hrs. - Continue BP management to maintain goal    On physical examination, numbness on the left side of her face has been resolved.  She was regained most of the strength in her left upper extremity as well as left lower extremity.  Patient was to be admitted for acute stroke workup.        Overview/Hospital Course:  No notes on file     interval History: Patient was seen in the morning A&O x4 denies any pain or weakness worked with PT and OT , pending cardiology recs .    Review of Systems   Constitutional:  Negative for activity change, chills and fever.   HENT:  Negative for congestion, rhinorrhea and sore throat.    Eyes:  Negative for discharge and redness.   Respiratory:  Negative for cough, chest tightness, shortness of breath and wheezing.    Cardiovascular:  Negative for chest pain, palpitations and leg swelling.   Gastrointestinal:  Negative for abdominal pain, constipation, diarrhea, nausea and vomiting.   Genitourinary:  Negative for dysuria, flank pain and hematuria.   Musculoskeletal:  Negative for back pain, myalgias and neck pain.   Skin:  Negative for pallor, rash and wound.   Neurological:  Negative for dizziness, tremors,  syncope, weakness, numbness and headaches.        Weakness and numbness on left side of face, left upper and lower extremities   Psychiatric/Behavioral:  Negative for agitation, confusion and hallucinations.      Objective:     Vital Signs (Most Recent):  Temp: 98.2 °F (36.8 °C) (10/12/24 1124)  Pulse: 76 (10/12/24 1124)  Resp: 18 (10/12/24 1124)  BP: (!) 146/68 (10/12/24 1124)  SpO2: 96 % (10/12/24 1153) Vital Signs (24h Range):  Temp:  [97.6 °F (36.4 °C)-98.4 °F (36.9 °C)] 98.2 °F (36.8 °C)  Pulse:  [62-91] 76  Resp:  [12-31] 18  SpO2:  [93 %-98 %] 96 %  BP: (101-180)/(51-80) 146/68     Weight: 62.7 kg (138 lb 3.7 oz)  Body mass index is 27 kg/m².    Intake/Output Summary (Last 24 hours) at 10/12/2024 1254  Last data filed at 10/11/2024 1700  Gross per 24 hour   Intake --   Output 400 ml   Net -400 ml         Physical Exam  Vitals and nursing note reviewed.   Constitutional:       General: She is not in acute distress.  HENT:      Head: Normocephalic and atraumatic.      Mouth/Throat:      Mouth: Mucous membranes are moist.   Eyes:      General:         Right eye: No discharge.         Left eye: No discharge.      Conjunctiva/sclera: Conjunctivae normal.   Cardiovascular:      Rate and Rhythm: Normal rate and regular rhythm.      Pulses: Normal pulses.   Pulmonary:      Effort: Pulmonary effort is normal.      Breath sounds: Normal breath sounds.   Abdominal:      General: Bowel sounds are normal.      Palpations: Abdomen is soft.   Musculoskeletal:         General: No swelling. Normal range of motion.      Cervical back: Normal range of motion and neck supple.      Right lower leg: No edema.      Left lower leg: No edema.   Skin:     General: Skin is warm and dry.   Neurological:      Mental Status: She is alert and oriented to person, place, and time. Mental status is at baseline.      Motor: No weakness.   Psychiatric:         Mood and Affect: Mood normal.             Significant Labs: All pertinent labs within  the past 24 hours have been reviewed.  Recent Lab Results         10/12/24  0637   10/12/24  0556        Albumin 3.4         ALP 64         ALT 12         Anion Gap 5         AST 15  Comment: Specimen slightly hemolyzed         Baso # 0.03         Basophil % 0.4         BILIRUBIN TOTAL 0.2  Comment: For infants and newborns, interpretation of results should be based  on gestational age, weight and in agreement with clinical  observations.    Premature Infant recommended reference ranges:  Up to 24 hours.............<8.0 mg/dL  Up to 48 hours............<12.0 mg/dL  3-5 days..................<15.0 mg/dL  6-29 days.................<15.0 mg/dL           BUN 17         Calcium 8.6         Chloride 111         CO2 24         Creatinine 0.8         Differential Method Automated         eGFR >60         Eos # 0.4         Eos % 4.5         Glucose 97         Gran # (ANC) 3.7         Gran % 46.7         Hematocrit 38.9         Hemoglobin 12.8         Immature Grans (Abs) 0.02  Comment: Mild elevation in immature granulocytes is non specific and   can be seen in a variety of conditions including stress response,   acute inflammation, trauma and pregnancy. Correlation with other   laboratory and clinical findings is essential.           Immature Granulocytes 0.3         Lymph # 3.2         Lymph % 40.6         MCH 28.1         MCHC 32.9         MCV 86         Mono # 0.6         Mono % 7.5         MPV 9.6         nRBC 0         Platelet Count 223         POCT Glucose   94       Potassium 4.2         PROTEIN TOTAL 6.4         RBC 4.55         RDW 12.3         Sodium 140         WBC 7.99                 Significant Imaging: I have reviewed all pertinent imaging results/findings within the past 24 hours.    Assessment/Plan:      * Acute ischemic right PCA stroke  Patient was developed left facial, left upper extremity and left lower extremity weakness this morning.  Symptoms resolved after TNK  Antithrombotics for secondary stroke  prevention:  We will start antiplatelets and anticoagulation after 24 hours, due to patient receiving TNK    Statins for secondary stroke prevention and hyperlipidemia, if present:   Statins: Atorvastatin- 40 mg daily    Aggressive risk factor modification: HTN, family history         Rehab efforts: The patient has been evaluated by a stroke team provider and the therapy needs have been fully considered based off the presenting complaints and exam findings. The following therapy evaluations are needed: PT evaluate and treat, OT evaluate and treat, SLP evaluate and treat      Echo Saline Bubble? Yes    Result Date: 10/11/2024    Left Ventricle: The left ventricle is normal in size. Normal wall   thickness. There is normal systolic function. Biplane (2D) method of discs   ejection fraction is 62%. There is normal diastolic function.    Right Ventricle: Normal right ventricular cavity size. Wall thickness   is normal. Systolic function is normal.    Left Atrium: Patent foramen ovale visualized with predominant right to   left shunting.    Mitral Valve: There is mild regurgitation.    Pulmonary Artery: The estimated pulmonary artery systolic pressure is   20 mmHg.    IVC/SVC: Normal venous pressure at 3 mmHg.       Diagnostics ordered/pending: CT scan of head without contrast to asses brain parenchyma, HgbA1C to assess blood glucose levels, Lipid Profile to assess cholesterol levels, MRI head without contrast to assess brain parenchyma, TTE to assess cardiac function/status , TSH to assess thyroid function    VTE prophylaxis: Mechanical prophylaxis: Place SCDs  We will start chemical prophylaxis after 24 hours    BP parameters: Infarct: Post Thrombolytic therapy, SBP <180    -start aspirin and Plavix after 24 hours post TNK  -neurology consult pending    -CT showed: 8 mm sclerotic lesion in the T4 vertebral body.  Patient follow-up with MRI thoracic spine without and with contrast is suggested.    Tele neurology was  consulted and recommended:    -IV Tenecteplase 0.25mg/kg IV push (max dose 25mg); If Tenecteplase is not available use Alteplase 0.9mg/kg IV bolus followed by infusion (max dose 90mg)   -BP goal prior to IV thrombolytics - <185/110 - Initiate BP management prior to treatment if not at goal  -BP goal post IV thrombolytics - <180/105 for at least 24 hrs. - Continue BP management to maintain goal     Additional Recommendations:   Neurological assessment and vital signs (except temperature) every 15 minutes x 2 hours, then every 30 minutes x 6 hours, then every hour x 16 hours..  Frequency of BP assessments may need to be increased if systolic BP stays >= 180 mm Hg or diastolic BP stays >= 105 mm Hg. Administer antihypertensive meds as ordered  Temperature every 4 hours or as required.  Follow hospital protocol for further orders re: post thrombolytic therapy patient management.  No antithrombotics or anticoagulants (including but not limited to: heparin, warfarin, aspirin, clopidigrel, or dipyridamole) for 24 hours, then start antithrombotics as ordered by treating physician       10/11 patient was downgraded to acute care unit patient will continue on aspirin and Plavix for 3 months cardiology consulted for PFO and history sudden death in the family due to cardiac arrhythmia.      Essential hypertension  Patients blood pressure range in the last 24 hours was: BP  Min: 100/54  Max: 200/101.The patient's inpatient anti-hypertensive regimen is listed below:  Current Antihypertensives  labetaloL injection 10 mg, Every 4 hours PRN, Intravenous  metoprolol succinate (TOPROL-XL) 24 hr split tablet 12.5 mg, Daily, Oral    Plan  - BP is controlled, no changes needed to their regimen  - keep SBP<180  - low-sodium diet      VTE Risk Mitigation (From admission, onward)           Ordered     enoxaparin injection 40 mg  Every 24 hours         10/11/24 0798     Reason for No Pharmacological VTE Prophylaxis  Once        Comments: No  anticoag for 24hrs   Question:  Reasons:  Answer:  Physician Provided (leave comment)  Comment:  s/p TNK    10/10/24 1919     IP VTE HIGH RISK PATIENT  Once         10/10/24 1919     Place sequential compression device  Until discontinued         10/10/24 1919                    Discharge Planning   ALEC:      Code Status: Full Code   Is the patient medically ready for discharge?: no    Reason for patient still in hospital medical mgx              Gaurav Sanders MD  Department of Hospital Medicine   [facility]

## 2024-10-12 NOTE — PLAN OF CARE
10/12/24 1452   Final Note   Assessment Type Final Discharge Note   Anticipated Discharge Disposition Home   What phone number can be called within the next 1-3 days to see how you are doing after discharge? [phone]   Post-Acute Status   Discharge Delays None known at this time      used [employer], [phone], spoke with  Lee, #[id number] to translate for ALEENA. Patient was able to complete brief assessment over the phone. Pt informed ALEENA that she lives with her spouse and is independent with her ADL's no medical equipment or HH services within the home.  Pt in unemployed but drives. Pt has no issues affording medication and has no social needs at this time.     Pts spouse will transport her home at D/C. Follow up appointments scheduled below.      ALEENA sent message to schedulers for Cardiology follow up with Dr. Rodriguez.    Future Appointments   Date Time Provider Department Center   11/13/2024  8:00 AM Rohan Garcia MD [facility] NEURO Rhonda Lopez

## 2024-10-12 NOTE — DISCHARGE SUMMARY
[facility] Medicine  Discharge Summary      Patient Name: Agnes Worley  MRN: [id number]  RAYSA: [id number]  Patient Class: IP- Inpatient  Admission Date: 10/10/2024  Hospital Length of Stay: 2 days  Discharge Date and Time: No discharge date for patient encounter.  Attending Physician: Gaurav Tena MD   Discharging Provider: Gaurav Sanders MD  Primary Care Provider: Bon Jenkins MD    Primary Care Team: Networked reference to record PCT     HPI:   59-year-old female with a past medical history of hypertension came to emergency department because of left-sided facial, left-sided upper extremity weakness and numbness.  The patient was also states she has a family history of strokes.  Patient was states she had headache for most of yesterday, it was concentrated in the front of her forehead and rated 10/10.  This morning she woke up around 6:00 a.m. with a continued headache, went back to bed around 8:00 a.m. and woke up around 945 to speak with her friend on the phone.  While on the phone she noticed tingling and numbing sensation on the left side of her lip and left hand.  She also mentioned it was taking longer for her to process information.  She finished over conversations, completed her house chores in the around 1:00 p.m. was speaking with her sister when she noticed the same symptoms again.  This time the numbness had worsened as well as the weakness.  She had these symptoms on the same left side of her face, upper and lower extremities.  The patient was stated she took 2 aspirins and called her  to inform him of her symptoms.  Her family decided to bring her to the emergency department.    CT head was done in the emergency department and showed no interval developing infarct especially in the distribution of the right PCA territory.  CTA head and neck showed CT scan of the head: Subtle loss of the gray-white differentiation in the right subinsular cortex.  MRI of the  brain may be obtained for confirmation. Old lacunar type infarct in the right thalamus. No evidence of intracranial hemorrhage. CTA brain: No large vessel occlusion in the intracranial circulation. High-grade narrowing the proximal aspect of the right P2.  CTA neck: No hemodynamically significant stenosis of the neck vessels. Atherosclerotic plaque at the origin of the left subclavian artery. 8 mm sclerotic lesion in the T4 vertebral body.  Patient follow-up with MRI thoracic spine without and with contrast is suggested.      Tele neurology was consulted and recommended:    -IV Tenecteplase 0.25mg/kg IV push (max dose 25mg); If Tenecteplase is not available use Alteplase 0.9mg/kg IV bolus followed by infusion (max dose 90mg)   -BP goal prior to IV thrombolytics - <185/110 - Initiate BP management prior to treatment if not at goal  -BP goal post IV thrombolytics - <180/105 for at least 24 hrs. - Continue BP management to maintain goal    On physical examination, numbness on the left side of her face has been resolved.  She was regained most of the strength in her left upper extremity as well as left lower extremity.  Patient was to be admitted for acute stroke workup.        * No surgery found *      Hospital Course:   A STROKE WAS GIVEN TPA WITH COMPLETE RESOLUTION OF NEUROLOGICAL SYMPTOMS, PATIENT WAS FOUND TO HAVE PFO ON ECHO, CARDIOLOGY WAS CONSULTED WHO RECOMMENDED OUTPATIENT FOLLOW-UP FOR LOOP RECORDER AND HENRY PATIENT IS AGREEABLE PATIENT WILL BE DISCHARGED ON ASPIRIN AND PLAVIX FOR 3 MONTHS AND THEN CONTINUE ON ASPIRIN.    Goals of Care Treatment Preferences:  Code Status: Full Code      SDOH Screening:  The patient was screened for utility difficulties, food insecurity, transport difficulties, housing insecurity, and interpersonal safety and there were no concerns identified this admission.     Consults:   Consults (From admission, onward)          Status Ordering Provider     [facility]        Provider:  Darek Rodriguez MD    Completed ONI LESTER     Inpatient consult to [facility]  Once        Provider:  Jeancarlos Aranda MD    Completed MALDONADO MARCANO     Inpatient consult to Physical Medicine Rehab  Once        Provider:  (Not yet assigned)    Acknowledged MALDONADO MARCANO     Inpatient consult to Registered Dietitian/Nutritionist  Once        Provider:  (Not yet assigned)    Completed MALDONADO MARCANO     IP consult to case management/social work  Once        Provider:  (Not yet assigned)    Completed MALDONADO MARCANO     Consult to Telemedicine - Acute Stroke  Once        Provider:  (Not yet assigned)    Acknowledged MALDONADO MARCANO            Neuro  * Acute ischemic right PCA stroke  Patient was developed left facial, left upper extremity and left lower extremity weakness this morning.  Symptoms resolved after TNK  Antithrombotics for secondary stroke prevention:  We will start antiplatelets and anticoagulation after 24 hours, due to patient receiving TNK    Statins for secondary stroke prevention and hyperlipidemia, if present:   Statins: Atorvastatin- 40 mg daily    Aggressive risk factor modification: HTN, family history         Rehab efforts: The patient has been evaluated by a stroke team provider and the therapy needs have been fully considered based off the presenting complaints and exam findings. The following therapy evaluations are needed: PT evaluate and treat, OT evaluate and treat, SLP evaluate and treat      Echo Saline Bubble? Yes    Result Date: 10/11/2024    Left Ventricle: The left ventricle is normal in size. Normal wall   thickness. There is normal systolic function. Biplane (2D) method of discs   ejection fraction is 62%. There is normal diastolic function.    Right Ventricle: Normal right ventricular cavity size. Wall thickness   is normal. Systolic function is normal.    Left Atrium: Patent foramen ovale visualized with predominant right to    left shunting.    Mitral Valve: There is mild regurgitation.    Pulmonary Artery: The estimated pulmonary artery systolic pressure is   20 mmHg.    IVC/SVC: Normal venous pressure at 3 mmHg.       Diagnostics ordered/pending: CT scan of head without contrast to asses brain parenchyma, HgbA1C to assess blood glucose levels, Lipid Profile to assess cholesterol levels, MRI head without contrast to assess brain parenchyma, TTE to assess cardiac function/status , TSH to assess thyroid function    VTE prophylaxis: Mechanical prophylaxis: Place SCDs  We will start chemical prophylaxis after 24 hours    BP parameters: Infarct: Post Thrombolytic therapy, SBP <180    -start aspirin and Plavix after 24 hours post TNK  -neurology consult pending    -CT showed: 8 mm sclerotic lesion in the T4 vertebral body.  Patient follow-up with MRI thoracic spine without and with contrast is suggested.    Tele neurology was consulted and recommended:    -IV Tenecteplase 0.25mg/kg IV push (max dose 25mg); If Tenecteplase is not available use Alteplase 0.9mg/kg IV bolus followed by infusion (max dose 90mg)   -BP goal prior to IV thrombolytics - <185/110 - Initiate BP management prior to treatment if not at goal  -BP goal post IV thrombolytics - <180/105 for at least 24 hrs. - Continue BP management to maintain goal     Additional Recommendations:   Neurological assessment and vital signs (except temperature) every 15 minutes x 2 hours, then every 30 minutes x 6 hours, then every hour x 16 hours..  Frequency of BP assessments may need to be increased if systolic BP stays >= 180 mm Hg or diastolic BP stays >= 105 mm Hg. Administer antihypertensive meds as ordered  Temperature every 4 hours or as required.  Follow hospital protocol for further orders re: post thrombolytic therapy patient management.  No antithrombotics or anticoagulants (including but not limited to: heparin, warfarin, aspirin, clopidigrel, or dipyridamole) for 24 hours, then  start antithrombotics as ordered by treating physician       10/11 patient was downgraded to acute care unit patient will continue on aspirin and Plavix for 3 months cardiology consulted for PFO and history sudden death in the family due to cardiac arrhythmia.  10/12 pt will f/u with cards as out pt for loop recorder and  HENRY      Cardiac/Vascular  Essential hypertension  Patients blood pressure range in the last 24 hours was: BP  Min: 100/54  Max: 200/101.The patient's inpatient anti-hypertensive regimen is listed below:  Current Antihypertensives  labetaloL injection 10 mg, Every 4 hours PRN, Intravenous  metoprolol succinate (TOPROL-XL) 24 hr split tablet 12.5 mg, Daily, Oral    Plan  - BP is controlled, no changes needed to their regimen  - keep SBP<180  - low-sodium diet  - on DC will restat home meds lisinopril      Final Active Diagnoses:    Diagnosis Date Noted POA    PRINCIPAL PROBLEM:  Acute ischemic right PCA stroke [I63.531] 10/10/2024 Unknown    Essential hypertension [I10] 06/13/2021 Yes      Problems Resolved During this Admission:       Discharged Condition: fair    Disposition:     Follow Up:   Follow-up Information       Bon Jenkins MD Follow up on 10/15/2024.    Specialty: Internal Medicine  Why: 12:15pm [facility] F/U  Contact information:  [address] [place] [id number]  [phone]               [facility] - Neurology Follow up on 11/13/2024.    Specialty: Neurology  Why: 8:00am [facility] F/U  Contact information:  [address] [facility] [address]  [facility] [address] [phone]  Additional information:  Please park in [address] C or D and use [address] entrance. Take Medical Office Bldg. elevators. [address]   Please check-in for all clinic appointments on the 1st floor, at the desk, in the MOB lobby before coming up to the clinic for your appointment.                         Patient Instructions:   No discharge procedures on file.    Significant Diagnostic Studies: N/A    Pending Diagnostic  Studies:       None           Medications:  Reconciled Home Medications:      Medication List        START taking these medications      aspirin 81 MG Chew  Take 1 tablet (81 mg total) by mouth once daily.  Start taking on: October 13, 2024     atorvastatin 40 MG tablet  Commonly known as: LIPITOR  Take 1 tablet (40 mg total) by mouth once daily.  Start taking on: October 13, 2024     clopidogreL 75 mg tablet  Commonly known as: PLAVIX  Take 1 tablet (75 mg total) by mouth once daily.  Start taking on: October 13, 2024     lisinopriL 20 MG tablet  Commonly known as: PRINIVIL,ZESTRIL  Take 20 mg by mouth.     meclizine 25 mg tablet  Commonly known as: ANTIVERT  Take 1 tablet (25 mg total) by mouth 3 (three) times daily as needed for Dizziness or Nausea.     ondansetron 4 MG Tbdl  Commonly known as: ZOFRAN-ODT  Take 1 tablet (4 mg total) by mouth every 6 (six) hours as needed.            CONTINUE taking these medications      albuterol 90 mcg/actuation inhaler  Commonly known as: PROAIR HFA  Inhale 2 puffs into the lungs every 6 (six) hours as needed for Wheezing or Shortness of Breath. Rescue     MAGNESIUM ORAL  Take 1 tablet by mouth once daily.     ONE DAILY MULTIVITAMIN per tablet  Generic drug: multivitamin  Take 1 tablet by mouth once daily.     POTASSIUM ORAL  Take 1 tablet by mouth once daily.     travoprost 0.004 % ophthalmic solution  Commonly known as: TRAVATAN Z  Place 1 drop into the right eye every evening.     VITAMIN C ORAL  Take 1 tablet by mouth once daily.     VITAMIN D ORAL  Take 1 tablet by mouth once daily.     ZYRTEC ORAL  Take 1 tablet by mouth as needed.            STOP taking these medications      ibuprofen 600 MG tablet  Commonly known as: ADVILMOTRIN            ASK your doctor about these medications      ipratropium 21 mcg (0.03 %) nasal spray  Commonly known as: ATROVENT  2 sprays by Nasal route 2 (two) times daily as needed.              Indwelling Lines/Drains at time of discharge:    Lines/Drains/Airways       None                   Time spent on the discharge of patient: 45 minutes         Gaurav Sanders MD  Department of Hospital Medicine  [facility]

## 2024-10-12 NOTE — CONSULTS
[facility] Consult Note     Attending Physician: Gaurav Tena MD  Cardiology Attending Physician: Darek Rodriguez MD  Date of Admit: 10/10/2024  Reason for Consult: Shunt/CVA      History of Present Illness:       Agnes Worley is a pleasant 59 y.o. female with PMH noted below in A/P admitted for CVA s/p TPA as per H/P now improving. Does endorse some atypical chest pain as outpatient with stress. Denies Chest Discomfort/MONTELONGO/SOB/Palpitation/Syncope. Endorses family hx of CVA and arrythmias. Had long discussion regarding possible PFO on TTE and next steps as well as holter monitoring which she is in agreement with.      History obtained by patient interview and supplemented by nursing documentation and chart review.   Objective   PMHx:  has a past medical history of Glaucoma and Hypertension.   SurgHx:  has a past surgical history that includes Glaucoma surgery.   FamHx: Family history is unknown by patient.   SocialHx:  reports that she has never smoked. She has never used smokeless tobacco. She reports current alcohol use. She reports that she does not use drugs.  Home Meds:  Current Outpatient Medications   Medication Instructions    albuterol (PROAIR HFA) 90 mcg/actuation inhaler 2 puffs, Inhalation, Every 6 hours PRN, Rescue    ascorbic acid (VITAMIN C ORAL) 1 tablet, Daily    cetirizine HCl (ZYRTEC ORAL) 1 tablet, As needed (PRN)    ergocalciferol, vitamin D2, (VITAMIN D ORAL) 1 tablet, Daily    ibuprofen (ADVIL,MOTRIN) 600 mg, Oral, Every 6 hours PRN    ipratropium (ATROVENT) 0.03 % nasal spray 2 sprays, Nasal, 2 times daily PRN    lisinopriL (PRINIVIL,ZESTRIL) 20 mg    MAGNESIUM ORAL 1 tablet, Daily    meclizine (ANTIVERT) 25 mg, Oral, 3 times daily PRN    multivitamin (ONE DAILY MULTIVITAMIN) per tablet 1 tablet, Daily    ondansetron (ZOFRAN-ODT) 4 mg, Oral, Every 6 hours PRN    POTASSIUM ORAL 1 tablet, Daily    travoprost (TRAVATAN Z) 0.004 % ophthalmic solution 1 drop, Nightly      Review of Systems: Comprehensive ROS was performed and is negative unless otherwise noted in HPI.     Objective:   Last 24 Hour Vital Signs:  BP  Min: 101/57  Max: 180/80  Temp  Av.1 °F (36.7 °C)  Min: 97.6 °F (36.4 °C)  Max: 98.4 °F (36.9 °C)  Pulse  Av.1  Min: 62  Max: 91  Resp  Av.1  Min: 12  Max: 31  SpO2  Av.7 %  Min: 93 %  Max: 98 %  Weight  Av.7 kg (138 lb 3.7 oz)  Min: 62.7 kg (138 lb 3.7 oz)  Max: 62.7 kg (138 lb 3.7 oz)  I/O last 3 completed shifts:  In: 45.3 [I.V.:45.3]  Out: 1400 [Urine:1400]  Physical Examination:  Constitutional: NAD, Atraumatic   HEENT: MMM  Cardiovascular: RRR, no murmurs noted, no chest tenderness to palpation, 2+ radial pulses b/l  Pulmonary: normal respiratory effort, CTAB, no crackles, wheezes  Abdominal: S/NT/ND  Musculoskeletal: No lower extremity edema noted  Neurological: Alert & oriented to self, location, time and situation. No gross neurological deficits  Skin: W/D/I  Psych: Appropriate affect, normal mood    Laboratory:  Personally reviewed    Other Results:  TTE:  Results for orders placed during the hospital encounter of 10/10/24    Echo Saline Bubble? Yes    Interpretation Summary    Left Ventricle: The left ventricle is normal in size. Normal wall thickness. There is normal systolic function. Biplane (2D) method of discs ejection fraction is 62%. There is normal diastolic function.    Right Ventricle: Normal right ventricular cavity size. Wall thickness is normal. Systolic function is normal.    Left Atrium: Patent foramen ovale visualized with predominant right to left shunting.    Mitral Valve: There is mild regurgitation.    Pulmonary Artery: The estimated pulmonary artery systolic pressure is 20 mmHg.    IVC/SVC: Normal venous pressure at 3 mmHg.    Stress Testing:   No results found for this or any previous visit.     Coronary Angiogram:  No results found for this or any previous visit.      55 minutes were spent on this visit  including time personally:  -Reviewing Medical records & lab results  -Independently reviewing and interpreting (if not documented by myself) EKGs, echocardiograms, catherizations   -Obtaining a history, performing a relevant exam, counseling/educating the patient/family  -Documenting clinical information in the EMR including ordering of tests  -Care coordination and communicating with other health care providers         Assessment/Plan:     Active Hospital Problems    Diagnosis    *Acute ischemic right PCA stroke    Essential hypertension       Agnes Worley is a 59 y.o. female with a PMHx of HTN, CATIE who presented with L sided weakness found to have Right PCA CVA s/p TPA with improvement in sx. Also w/ significant family hx of arrythmia and CVA per pt. Cardiology consulted for eval of possible shunt/PFO on TTE in setting of CVA in <61 yo.     -agree with ASA, plavix  -Will arrange for outpatient HENRY and Event monitor  -to f/u in clinic w/ us   -Consider Sleep study as outpatient    Thank you for the opportunity to care for this patient. Please don't hesitate to reach out with any questions/concerns,      Darek Rodriguez MD  Interventional Cardiology  [facility]  [place]

## 2024-10-12 NOTE — ASSESSMENT & PLAN NOTE
Patients blood pressure range in the last 24 hours was: BP  Min: 100/54  Max: 200/101.The patient's inpatient anti-hypertensive regimen is listed below:  Current Antihypertensives  labetaloL injection 10 mg, Every 4 hours PRN, Intravenous    Plan  - BP is controlled, no changes needed to their regimen  - keep SBP<180  - low-sodium diet

## 2024-10-12 NOTE — PLAN OF CARE
Problem: Stroke, Ischemic (Includes Transient Ischemic Attack)  Goal: Optimal Coping  Outcome: Met  Goal: Effective Bowel Elimination  Outcome: Met  Goal: Optimal Cerebral Tissue Perfusion  Outcome: Met  Goal: Optimal Cognitive Function  Outcome: Met  Goal: Improved Communication Skills  Outcome: Met  Goal: Optimal Functional Ability  Outcome: Met  Goal: Optimal Nutrition Intake  Outcome: Met  Goal: Effective Oxygenation and Ventilation  Outcome: Met  Goal: Improved Sensorimotor Function  Outcome: Met  Goal: Safe and Effective Swallow  Outcome: Met  Goal: Effective Urinary Elimination  Outcome: Met     Problem: Fall Injury Risk  Goal: Absence of Fall and Fall-Related Injury  Outcome: Met     Problem: Adult Inpatient Plan of Care  Goal: Plan of Care Review  10/12/2024 1848 by Michelle Ash RN  Outcome: Met  10/12/2024 0807 by Michelle Ash RN  Outcome: Progressing  Goal: Patient-Specific Goal (Individualized)  Outcome: Met  Goal: Absence of Hospital-Acquired Illness or Injury  Outcome: Met  Goal: Optimal Comfort and Wellbeing  Outcome: Met  Goal: Readiness for Transition of Care  Outcome: Met     Problem: Infection  Goal: Absence of Infection Signs and Symptoms  Outcome: Met     Problem: Occupational Therapy  Goal: Occupational Therapy Goal  Description: Goals to be met by: 24     Patient will increase functional independence with ADLs by performing:    UE Dressing with [place].  LE Dressing with [place].  Grooming while standing at sink with [place].  Toileting from toilet with [place] for hygiene and clothing management.   Toilet transfer to toilet with [place].  Outcome: Met     Problem: Physical Therapy  Goal: Physical Therapy Goal  Description: Goals to be met by: 24     Patient will increase functional independence with mobility by performin. Supine to sit with [place]  2. Sit to supine with [place]    3. Sit to stand transfer with [place] with no AD.  4.  Bed to chair transfer with [place] using No Assistive Device  5. Gait  x 150 feet with [place] using No Assistive Device.     Outcome: Met

## 2024-10-12 NOTE — PROGRESS NOTES
[facility] Medicine  Progress Note    Patient Name: Agnes Worley  MRN: [id number]  Patient Class: IP- Inpatient   Admission Date: 10/10/2024  Length of Stay: 1 days  Attending Physician: Gaurav Tena MD  Primary Care Provider: Bon Jenkins MD        Subjective:     Principal Problem:Acute ischemic right PCA stroke        HPI:  59-year-old female with a past medical history of hypertension came to emergency department because of left-sided facial, left-sided upper extremity weakness and numbness.  The patient was also states she has a family history of strokes.  Patient was states she had headache for most of yesterday, it was concentrated in the front of her forehead and rated 10/10.  This morning she woke up around 6:00 a.m. with a continued headache, went back to bed around 8:00 a.m. and woke up around 945 to speak with her friend on the phone.  While on the phone she noticed tingling and numbing sensation on the left side of her lip and left hand.  She also mentioned it was taking longer for her to process information.  She finished over conversations, completed her house chores in the around 1:00 p.m. was speaking with her sister when she noticed the same symptoms again.  This time the numbness had worsened as well as the weakness.  She had these symptoms on the same left side of her face, upper and lower extremities.  The patient was stated she took 2 aspirins and called her  to inform him of her symptoms.  Her family decided to bring her to the emergency department.    CT head was done in the emergency department and showed no interval developing infarct especially in the distribution of the right PCA territory.  CTA head and neck showed CT scan of the head: Subtle loss of the gray-white differentiation in the right subinsular cortex.  MRI of the brain may be obtained for confirmation. Old lacunar type infarct in the right thalamus. No evidence of intracranial  hemorrhage. CTA brain: No large vessel occlusion in the intracranial circulation. High-grade narrowing the proximal aspect of the right P2.  CTA neck: No hemodynamically significant stenosis of the neck vessels. Atherosclerotic plaque at the origin of the left subclavian artery. 8 mm sclerotic lesion in the T4 vertebral body.  Patient follow-up with MRI thoracic spine without and with contrast is suggested.      Tele neurology was consulted and recommended:    -IV Tenecteplase 0.25mg/kg IV push (max dose 25mg); If Tenecteplase is not available use Alteplase 0.9mg/kg IV bolus followed by infusion (max dose 90mg)   -BP goal prior to IV thrombolytics - <185/110 - Initiate BP management prior to treatment if not at goal  -BP goal post IV thrombolytics - <180/105 for at least 24 hrs. - Continue BP management to maintain goal    On physical examination, numbness on the left side of her face has been resolved.  She was regained most of the strength in her left upper extremity as well as left lower extremity.  Patient was to be admitted for acute stroke workup.        Overview/Hospital Course:  No notes on file     interval History: Patient was seen in the morning A&O x4 denies any pain or weakness worked with PT and OT patient to continue monitored in the ICU and then to be downgraded to the acute unit.    Review of Systems   Constitutional:  Negative for activity change, chills and fever.   HENT:  Negative for congestion, rhinorrhea and sore throat.    Eyes:  Negative for discharge and redness.   Respiratory:  Negative for cough, chest tightness, shortness of breath and wheezing.    Cardiovascular:  Negative for chest pain, palpitations and leg swelling.   Gastrointestinal:  Negative for abdominal pain, constipation, diarrhea, nausea and vomiting.   Genitourinary:  Negative for dysuria, flank pain and hematuria.   Musculoskeletal:  Negative for back pain, myalgias and neck pain.   Skin:  Negative for pallor, rash and  wound.   Neurological:  Negative for dizziness, tremors, syncope, weakness, numbness and headaches.        Weakness and numbness on left side of face, left upper and lower extremities   Psychiatric/Behavioral:  Negative for agitation, confusion and hallucinations.      Objective:     Vital Signs (Most Recent):  Temp: 98.4 °F (36.9 °C) (10/11/24 1848)  Pulse: 68 (10/11/24 1848)  Resp: 17 (10/11/24 1848)  BP: 124/67 (10/11/24 1848)  SpO2: (!) 94 % (10/11/24 1848) Vital Signs (24h Range):  Temp:  [97.6 °F (36.4 °C)-98.5 °F (36.9 °C)] 98.4 °F (36.9 °C)  Pulse:  [56-91] 68  Resp:  [11-45] 17  SpO2:  [93 %-98 %] 94 %  BP: (100-181)/(51-90) 124/67     Weight: 62.7 kg (138 lb 3.7 oz)  Body mass index is 27 kg/m².    Intake/Output Summary (Last 24 hours) at 10/11/2024 1924  Last data filed at 10/11/2024 1700  Gross per 24 hour   Intake 45.26 ml   Output 1400 ml   Net -1354.74 ml         Physical Exam  Vitals and nursing note reviewed.   Constitutional:       General: She is not in acute distress.  HENT:      Head: Normocephalic and atraumatic.      Mouth/Throat:      Mouth: Mucous membranes are moist.   Eyes:      General:         Right eye: No discharge.         Left eye: No discharge.      Conjunctiva/sclera: Conjunctivae normal.   Cardiovascular:      Rate and Rhythm: Normal rate and regular rhythm.      Pulses: Normal pulses.   Pulmonary:      Effort: Pulmonary effort is normal.      Breath sounds: Normal breath sounds.   Abdominal:      General: Bowel sounds are normal.      Palpations: Abdomen is soft.   Musculoskeletal:         General: No swelling. Normal range of motion.      Cervical back: Normal range of motion and neck supple.      Right lower leg: No edema.      Left lower leg: No edema.   Skin:     General: Skin is warm and dry.   Neurological:      Mental Status: She is alert and oriented to person, place, and time. Mental status is at baseline.      Motor: No weakness.   Psychiatric:         Mood and Affect:  Mood normal.             Significant Labs: All pertinent labs within the past 24 hours have been reviewed.  Recent Lab Results         10/11/24  1118   10/11/24  0501        A2C EF 61         A4C EF 63         Albumin   3.4       ALP   63       ALT   11       Anion Gap   8       Ascending aorta 2.94         Ao peak sanjay 1.1         Ao VTI 24.1         PTT   27.1  Comment: Refer to local heparin nomogram for intensity/dose specific   therapeutic   range.  LOT^050^APTT FSL^[id number]         AST   15       AV valve area 2.6         FANG by Velocity Ratio 2.8         AV mean gradient 3.2         AV index (prosthetic) 0.75         AV peak gradient 4.8         AV Velocity Ratio 0.82         Baso #   0.04       Basophil %   0.5       BILIRUBIN TOTAL   0.4  Comment: For infants and newborns, interpretation of results should be based  on gestational age, weight and in agreement with clinical  observations.    Premature Infant recommended reference ranges:  Up to 24 hours.............<8.0 mg/dL  Up to 48 hours............<12.0 mg/dL  3-5 days..................<15.0 mg/dL  6-29 days.................<15.0 mg/dL         BSA 1.66         BUN   15       Calcium   8.7       Chloride   108       Cholesterol Total   194  Comment: The National Cholesterol Education Program (NCEP) has set the  following guidelines (reference ranges) for Cholesterol:  Optimal.....................<200 mg/dL  Borderline High.............200-239 mg/dL  High........................> or = 240 mg/dL         CO2   22       Creatinine   0.7       Left Ventricle Relative Wall Thickness 0.37         Differential Method   Automated       E/A ratio 0.74         E/E' ratio 10.83         eGFR   >60       Eos #   0.3       Eos %   3.1       Estimated Avg Glucose   105       E wave deceleration time 280.40         FS 32.6         Glucose   99       Gran # (ANC)   4.1       Gran %   48.7       HDL   56  Comment: The National Cholesterol Education Program (NCEP) has set  the  following guidelines (reference values) for HDL Cholesterol:  Low...............<40 mg/dL  Optimal...........>60 mg/dL         HDL/Cholesterol Ratio   28.9       Hematocrit   36.7       Hemoglobin   12.3       Hemoglobin A1C External   5.3  Comment: ADA Screening Guidelines:  5.7-6.4%  Consistent with prediabetes  >or=6.5%  Consistent with diabetes    High levels of fetal hemoglobin interfere with the HbA1C  assay. Heterozygous hemoglobin variants (HbS, HgC, etc)do  not significantly interfere with this assay.   However, presence of multiple variants may affect accuracy.         Immature Grans (Abs)   0.02  Comment: Mild elevation in immature granulocytes is non specific and   can be seen in a variety of conditions including stress response,   acute inflammation, trauma and pregnancy. Correlation with other   laboratory and clinical findings is essential.         Immature Granulocytes   0.2       INR   1.0  Comment: Coumadin Therapy:  2.0 - 3.0 for INR for all indicators except mechanical heart valves  and antiphospholipid syndromes which should use 2.5 - 3.5.  LOT^040^PT Inn^[id number]         IVC diameter 1.03         IVSd 0.9         LA area A2C 12.55         LA area A4C 14.04         LA size 3.41         LA Vol 33.21         ASHLEY (MOD) 20.5         LVOT area 3.5         LDL Cholesterol   123.4  Comment: The National Cholesterol Education Program (NCEP) has set the  following guidelines (reference values) for LDL Cholesterol:  Optimal.......................<130 mg/dL  Borderline High...............130-159 mg/dL  High..........................160-189 mg/dL  Very High.....................>190 mg/dL         LV LATERAL E/E' RATIO 9.29         LV SEPTAL E/E' RATIO 13.00         LV EDV BP 83.28         LV Diastolic Volume Index 51.41         Left Ventricular End Diastolic Volume by Teichholz Method 83.28         LV EDV A2C [id number]         LV EDV A4C 56.69         Left Ventricular End Systolic Volume by  "Teichholz Method 33.14         LV ESV A2C 30.84         LV ESV A4C 34.81         LVIDd 4.3         LVIDs 2.9         LV mass 114.2         LV Mass Index 70.5         Left Ventricular Outflow Tract Mean Gradient 1.61         Left Ventricular Outflow Tract Mean Velocity 0.61         LVOT diameter 2.1         LVOT peak carlos 0.9         LVOT stroke volume 62.7         LVOT peak VTI 18.1         LV ESV BP 33.14         LV Systolic Volume Index 20.5         Lymph #   3.4       Lymph %   39.7       Magnesium    2.0       MCH   28.0       MCHC   33.5       MCV   84       Mean e' 0.06         Mono #   0.7       Mono %   7.8       MPV   9.5       MV valve area p 1/2 method 2.71         MV "A" wave duration [id number]         MV valve area by continuity eq 2.82         MV peak gradient 3         MV Peak A Carlos 0.88         MV Peak E Carlos 0.65         MV stenosis pressure 1/2 time 81.32         MV VTI 22.2         Non-HDL Cholesterol   138  Comment: Risk category and Non-HDL cholesterol goals:  Coronary heart disease (CHD)or equivalent (10-year risk of CHD >20%):  Non-HDL cholesterol goal     <130 mg/dL  Two or more CHD risk factors and 10-year risk of CHD <= 20%:  Non-HDL cholesterol goal     <160 mg/dL  0 to 1 CHD risk factor:  Non-HDL cholesterol goal     <190 mg/dL         nRBC   0       Garcia's Biplane MOD Ejection Fraction 62         Phosphorus Level   4.4       Platelet Count   227       Potassium   3.6       PROTEIN TOTAL   6.3       PT   10.8       Pulmonary Valve Mean Velocity 0.61         PV peak gradient 2         PV Peak D Carlos 0.29         PV Peak S Carlos 0.51         PV PEAK VELOCITY 0.74         Pulm vein S/D ratio 1.76         PW 0.8         RA Vol 32.58         RA Area 12.4         Est. RA pres 3         RA area length vol 29.89         RBC   4.39       RDW   12.3       RV S' 10.06         RV TB RVSP 5         Sinus 3.00         Sodium   138       STJ 2.85         TAPSE 1.99         TDI SEPTAL 0.05      "    TDI LATERAL 0.07         Total Cholesterol/HDL Ratio   3.5       Triglycerides   73  Comment: The National Cholesterol Education Program (NCEP) has set the  following guidelines (reference values) for triglycerides:  Normal......................<150 mg/dL  Borderline High.............150-199 mg/dL  High........................200-499 mg/dL         Triscuspid Valve Regurgitation Peak Gradient 17         TR Max Carlos 2.04         Troponin I   <0.006  Comment: The reference interval for Troponin I represents the 99th percentile   cutoff   for our facility and is consistent with 3rd generation assay   performance.         TSH   3.140       TV resting pulmonary artery pressure 20         WBC   8.44       ZLVIDD -0.64         ZLVIDS 0.16                 Significant Imaging: I have reviewed all pertinent imaging results/findings within the past 24 hours.    Assessment/Plan:      * Acute ischemic right PCA stroke  Patient was developed left facial, left upper extremity and left lower extremity weakness this morning.  Symptoms resolved after TNK  Antithrombotics for secondary stroke prevention:  We will start antiplatelets and anticoagulation after 24 hours, due to patient receiving TNK    Statins for secondary stroke prevention and hyperlipidemia, if present:   Statins: Atorvastatin- 40 mg daily    Aggressive risk factor modification: HTN, family history         Rehab efforts: The patient has been evaluated by a stroke team provider and the therapy needs have been fully considered based off the presenting complaints and exam findings. The following therapy evaluations are needed: PT evaluate and treat, OT evaluate and treat, SLP evaluate and treat      Echo Saline Bubble? Yes    Result Date: 10/11/2024    Left Ventricle: The left ventricle is normal in size. Normal wall   thickness. There is normal systolic function. Biplane (2D) method of discs   ejection fraction is 62%. There is normal diastolic function.    Right  Ventricle: Normal right ventricular cavity size. Wall thickness   is normal. Systolic function is normal.    Left Atrium: Patent foramen ovale visualized with predominant right to   left shunting.    Mitral Valve: There is mild regurgitation.    Pulmonary Artery: The estimated pulmonary artery systolic pressure is   20 mmHg.    IVC/SVC: Normal venous pressure at 3 mmHg.      10/11 patient was downgraded to acute care unit patient will continue on aspirin and Plavix for 3 months cardiology consulted for PFO and history sudden death in the family due to cardiac arrhythmia.  Diagnostics ordered/pending: CT scan of head without contrast to asses brain parenchyma, HgbA1C to assess blood glucose levels, Lipid Profile to assess cholesterol levels, MRI head without contrast to assess brain parenchyma, TTE to assess cardiac function/status , TSH to assess thyroid function    VTE prophylaxis: Mechanical prophylaxis: Place SCDs  We will start chemical prophylaxis after 24 hours    BP parameters: Infarct: Post Thrombolytic therapy, SBP <180    -start aspirin and Plavix after 24 hours post TNK  -neurology consult pending    -CT showed: 8 mm sclerotic lesion in the T4 vertebral body.  Patient follow-up with MRI thoracic spine without and with contrast is suggested.    Tele neurology was consulted and recommended:    -IV Tenecteplase 0.25mg/kg IV push (max dose 25mg); If Tenecteplase is not available use Alteplase 0.9mg/kg IV bolus followed by infusion (max dose 90mg)   -BP goal prior to IV thrombolytics - <185/110 - Initiate BP management prior to treatment if not at goal  -BP goal post IV thrombolytics - <180/105 for at least 24 hrs. - Continue BP management to maintain goal     Additional Recommendations:   Neurological assessment and vital signs (except temperature) every 15 minutes x 2 hours, then every 30 minutes x 6 hours, then every hour x 16 hours..  Frequency of BP assessments may need to be increased if systolic BP  stays >= 180 mm Hg or diastolic BP stays >= 105 mm Hg. Administer antihypertensive meds as ordered  Temperature every 4 hours or as required.  Follow hospital protocol for further orders re: post thrombolytic therapy patient management.  No antithrombotics or anticoagulants (including but not limited to: heparin, warfarin, aspirin, clopidigrel, or dipyridamole) for 24 hours, then start antithrombotics as ordered by treating physician    Essential hypertension  Patients blood pressure range in the last 24 hours was: BP  Min: 100/54  Max: 200/101.The patient's inpatient anti-hypertensive regimen is listed below:  Current Antihypertensives  labetaloL injection 10 mg, Every 4 hours PRN, Intravenous    Plan  - BP is controlled, no changes needed to their regimen  - keep SBP<180  - low-sodium diet      VTE Risk Mitigation (From admission, onward)           Ordered     enoxaparin injection 40 mg  Every 24 hours         10/11/24 1839     Reason for No Pharmacological VTE Prophylaxis  Once        Comments: No anticoag for 24hrs   Question:  Reasons:  Answer:  Physician Provided (leave comment)  Comment:  s/p TNK    10/10/24 1919     IP VTE HIGH RISK PATIENT  Once         10/10/24 1919     Place sequential compression device  Until discontinued         10/10/24 1919                    Discharge Planning   ALEC:      Code Status: Full Code   Is the patient medically ready for discharge?:     Reason for patient still in hospital treatment  Discharge Plan A: Home with family                 Garuav Sanders MD  Department of Hospital Medicine   [facility]

## 2024-10-12 NOTE — SUBJECTIVE & OBJECTIVE
interval History: Patient was seen in the morning A&O x4 denies any pain or weakness worked with PT and OT , pending cardiology recs .    Review of Systems   Constitutional:  Negative for activity change, chills and fever.   HENT:  Negative for congestion, rhinorrhea and sore throat.    Eyes:  Negative for discharge and redness.   Respiratory:  Negative for cough, chest tightness, shortness of breath and wheezing.    Cardiovascular:  Negative for chest pain, palpitations and leg swelling.   Gastrointestinal:  Negative for abdominal pain, constipation, diarrhea, nausea and vomiting.   Genitourinary:  Negative for dysuria, flank pain and hematuria.   Musculoskeletal:  Negative for back pain, myalgias and neck pain.   Skin:  Negative for pallor, rash and wound.   Neurological:  Negative for dizziness, tremors, syncope, weakness, numbness and headaches.        Weakness and numbness on left side of face, left upper and lower extremities   Psychiatric/Behavioral:  Negative for agitation, confusion and hallucinations.      Objective:     Vital Signs (Most Recent):  Temp: 98.2 °F (36.8 °C) (10/12/24 1124)  Pulse: 76 (10/12/24 1124)  Resp: 18 (10/12/24 1124)  BP: (!) 146/68 (10/12/24 1124)  SpO2: 96 % (10/12/24 1153) Vital Signs (24h Range):  Temp:  [97.6 °F (36.4 °C)-98.4 °F (36.9 °C)] 98.2 °F (36.8 °C)  Pulse:  [62-91] 76  Resp:  [12-31] 18  SpO2:  [93 %-98 %] 96 %  BP: (101-180)/(51-80) 146/68     Weight: 62.7 kg (138 lb 3.7 oz)  Body mass index is 27 kg/m².    Intake/Output Summary (Last 24 hours) at 10/12/2024 1254  Last data filed at 10/11/2024 1700  Gross per 24 hour   Intake --   Output 400 ml   Net -400 ml         Physical Exam  Vitals and nursing note reviewed.   Constitutional:       General: She is not in acute distress.  HENT:      Head: Normocephalic and atraumatic.      Mouth/Throat:      Mouth: Mucous membranes are moist.   Eyes:      General:         Right eye: No discharge.         Left eye: No discharge.       Conjunctiva/sclera: Conjunctivae normal.   Cardiovascular:      Rate and Rhythm: Normal rate and regular rhythm.      Pulses: Normal pulses.   Pulmonary:      Effort: Pulmonary effort is normal.      Breath sounds: Normal breath sounds.   Abdominal:      General: Bowel sounds are normal.      Palpations: Abdomen is soft.   Musculoskeletal:         General: No swelling. Normal range of motion.      Cervical back: Normal range of motion and neck supple.      Right lower leg: No edema.      Left lower leg: No edema.   Skin:     General: Skin is warm and dry.   Neurological:      Mental Status: She is alert and oriented to person, place, and time. Mental status is at baseline.      Motor: No weakness.   Psychiatric:         Mood and Affect: Mood normal.             Significant Labs: All pertinent labs within the past 24 hours have been reviewed.  Recent Lab Results         10/12/24  0637   10/12/24  0556        Albumin 3.4         ALP 64         ALT 12         Anion Gap 5         AST 15  Comment: Specimen slightly hemolyzed         Baso # 0.03         Basophil % 0.4         BILIRUBIN TOTAL 0.2  Comment: For infants and newborns, interpretation of results should be based  on gestational age, weight and in agreement with clinical  observations.    Premature Infant recommended reference ranges:  Up to 24 hours.............<8.0 mg/dL  Up to 48 hours............<12.0 mg/dL  3-5 days..................<15.0 mg/dL  6-29 days.................<15.0 mg/dL           BUN 17         Calcium 8.6         Chloride 111         CO2 24         Creatinine 0.8         Differential Method Automated         eGFR >60         Eos # 0.4         Eos % 4.5         Glucose 97         Gran # (ANC) 3.7         Gran % 46.7         Hematocrit 38.9         Hemoglobin 12.8         Immature Grans (Abs) 0.02  Comment: Mild elevation in immature granulocytes is non specific and   can be seen in a variety of conditions including stress response,   acute  inflammation, trauma and pregnancy. Correlation with other   laboratory and clinical findings is essential.           Immature Granulocytes 0.3         Lymph # 3.2         Lymph % 40.6         MCH 28.1         MCHC 32.9         MCV 86         Mono # 0.6         Mono % 7.5         MPV 9.6         nRBC 0         Platelet Count 223         POCT Glucose   94       Potassium 4.2         PROTEIN TOTAL 6.4         RBC 4.55         RDW 12.3         Sodium 140         WBC 7.99                 Significant Imaging: I have reviewed all pertinent imaging results/findings within the past 24 hours.

## 2024-10-12 NOTE — PLAN OF CARE
Problem: Stroke, Ischemic (Includes Transient Ischemic Attack)  Goal: Optimal Coping  Outcome: Progressing  Intervention: Support Psychosocial Response to Stroke  Flowsheets (Taken 10/12/2024 1250)  Supportive Measures:   active listening utilized   counseling provided   decision-making supported   goal-setting facilitated     Problem: Fall Injury Risk  Goal: Absence of Fall and Fall-Related Injury  Outcome: Progressing  Intervention: Identify and Manage Contributors  Flowsheets (Taken 10/12/2024 1250)  Self-Care Promotion: BADL personal objects within reach  Medication Review/Management: medications reviewed     Problem: Adult Inpatient Plan of Care  Goal: Plan of Care Review  Outcome: Progressing

## 2024-10-12 NOTE — ASSESSMENT & PLAN NOTE
Patients blood pressure range in the last 24 hours was: BP  Min: 100/54  Max: 200/101.The patient's inpatient anti-hypertensive regimen is listed below:  Current Antihypertensives  labetaloL injection 10 mg, Every 4 hours PRN, Intravenous  metoprolol succinate (TOPROL-XL) 24 hr split tablet 12.5 mg, Daily, Oral    Plan  - BP is controlled, no changes needed to their regimen  - keep SBP<180  - low-sodium diet

## 2024-10-12 NOTE — PROGRESS NOTES
Discharge orders noted. Additional clinical references attached. Patient's discharge instructions given by bedside RN and reviewed via this VN. Patient is fluent in English and refused  services.  Education provided on new and previous medications, diagnosis, and follow-up appointments.  New medications were sent to patient's  pharmacy. Patient verbalized understanding and teach back method was used. Patient's ride/transportation home at bedside. All questions answered. Transport to [facility] will be requested. Floor nurse notified.                10/12/24 6174    Notification    Notified Of Discharge Status   Admission   Communication Issues? None   Shift   Virtual Nurse - Rounding Complete   Virtual Nurse - Patient Verbalized Approval Of Camera Use   Safety/Activity   Patient Rounds bed in low position;call light in patient/parent reach;clutter free environment maintained;visualized patient   Safety Promotion/Fall Prevention side rails raised x 2   Positioning   Body Position supine   Head of Bed (HOB) Positioning HOB elevated

## 2024-10-12 NOTE — ASSESSMENT & PLAN NOTE
Patient was developed left facial, left upper extremity and left lower extremity weakness this morning.  Symptoms resolved after TNK  Antithrombotics for secondary stroke prevention:  We will start antiplatelets and anticoagulation after 24 hours, due to patient receiving TNK    Statins for secondary stroke prevention and hyperlipidemia, if present:   Statins: Atorvastatin- 40 mg daily    Aggressive risk factor modification: HTN, family history         Rehab efforts: The patient has been evaluated by a stroke team provider and the therapy needs have been fully considered based off the presenting complaints and exam findings. The following therapy evaluations are needed: PT evaluate and treat, OT evaluate and treat, SLP evaluate and treat      Echo Saline Bubble? Yes    Result Date: 10/11/2024    Left Ventricle: The left ventricle is normal in size. Normal wall   thickness. There is normal systolic function. Biplane (2D) method of discs   ejection fraction is 62%. There is normal diastolic function.    Right Ventricle: Normal right ventricular cavity size. Wall thickness   is normal. Systolic function is normal.    Left Atrium: Patent foramen ovale visualized with predominant right to   left shunting.    Mitral Valve: There is mild regurgitation.    Pulmonary Artery: The estimated pulmonary artery systolic pressure is   20 mmHg.    IVC/SVC: Normal venous pressure at 3 mmHg.      10/11 patient was downgraded to acute care unit patient will continue on aspirin and Plavix for 3 months cardiology consulted for PFO and history sudden death in the family due to cardiac arrhythmia.  Diagnostics ordered/pending: CT scan of head without contrast to asses brain parenchyma, HgbA1C to assess blood glucose levels, Lipid Profile to assess cholesterol levels, MRI head without contrast to assess brain parenchyma, TTE to assess cardiac function/status , TSH to assess thyroid function    VTE prophylaxis: Mechanical prophylaxis: Place  SCDs  We will start chemical prophylaxis after 24 hours    BP parameters: Infarct: Post Thrombolytic therapy, SBP <180    -start aspirin and Plavix after 24 hours post TNK  -neurology consult pending    -CT showed: 8 mm sclerotic lesion in the T4 vertebral body.  Patient follow-up with MRI thoracic spine without and with contrast is suggested.    Tele neurology was consulted and recommended:    -IV Tenecteplase 0.25mg/kg IV push (max dose 25mg); If Tenecteplase is not available use Alteplase 0.9mg/kg IV bolus followed by infusion (max dose 90mg)   -BP goal prior to IV thrombolytics - <185/110 - Initiate BP management prior to treatment if not at goal  -BP goal post IV thrombolytics - <180/105 for at least 24 hrs. - Continue BP management to maintain goal     Additional Recommendations:   Neurological assessment and vital signs (except temperature) every 15 minutes x 2 hours, then every 30 minutes x 6 hours, then every hour x 16 hours..  Frequency of BP assessments may need to be increased if systolic BP stays >= 180 mm Hg or diastolic BP stays >= 105 mm Hg. Administer antihypertensive meds as ordered  Temperature every 4 hours or as required.  Follow hospital protocol for further orders re: post thrombolytic therapy patient management.  No antithrombotics or anticoagulants (including but not limited to: heparin, warfarin, aspirin, clopidigrel, or dipyridamole) for 24 hours, then start antithrombotics as ordered by treating physician

## 2024-10-12 NOTE — ASSESSMENT & PLAN NOTE
Patient was developed left facial, left upper extremity and left lower extremity weakness this morning.  Symptoms resolved after TNK  Antithrombotics for secondary stroke prevention:  We will start antiplatelets and anticoagulation after 24 hours, due to patient receiving TNK    Statins for secondary stroke prevention and hyperlipidemia, if present:   Statins: Atorvastatin- 40 mg daily    Aggressive risk factor modification: HTN, family history         Rehab efforts: The patient has been evaluated by a stroke team provider and the therapy needs have been fully considered based off the presenting complaints and exam findings. The following therapy evaluations are needed: PT evaluate and treat, OT evaluate and treat, SLP evaluate and treat      Echo Saline Bubble? Yes    Result Date: 10/11/2024    Left Ventricle: The left ventricle is normal in size. Normal wall   thickness. There is normal systolic function. Biplane (2D) method of discs   ejection fraction is 62%. There is normal diastolic function.    Right Ventricle: Normal right ventricular cavity size. Wall thickness   is normal. Systolic function is normal.    Left Atrium: Patent foramen ovale visualized with predominant right to   left shunting.    Mitral Valve: There is mild regurgitation.    Pulmonary Artery: The estimated pulmonary artery systolic pressure is   20 mmHg.    IVC/SVC: Normal venous pressure at 3 mmHg.       Diagnostics ordered/pending: CT scan of head without contrast to asses brain parenchyma, HgbA1C to assess blood glucose levels, Lipid Profile to assess cholesterol levels, MRI head without contrast to assess brain parenchyma, TTE to assess cardiac function/status , TSH to assess thyroid function    VTE prophylaxis: Mechanical prophylaxis: Place SCDs  We will start chemical prophylaxis after 24 hours    BP parameters: Infarct: Post Thrombolytic therapy, SBP <180    -start aspirin and Plavix after 24 hours post TNK  -neurology consult  pending    -CT showed: 8 mm sclerotic lesion in the T4 vertebral body.  Patient follow-up with MRI thoracic spine without and with contrast is suggested.    Tele neurology was consulted and recommended:    -IV Tenecteplase 0.25mg/kg IV push (max dose 25mg); If Tenecteplase is not available use Alteplase 0.9mg/kg IV bolus followed by infusion (max dose 90mg)   -BP goal prior to IV thrombolytics - <185/110 - Initiate BP management prior to treatment if not at goal  -BP goal post IV thrombolytics - <180/105 for at least 24 hrs. - Continue BP management to maintain goal     Additional Recommendations:   Neurological assessment and vital signs (except temperature) every 15 minutes x 2 hours, then every 30 minutes x 6 hours, then every hour x 16 hours..  Frequency of BP assessments may need to be increased if systolic BP stays >= 180 mm Hg or diastolic BP stays >= 105 mm Hg. Administer antihypertensive meds as ordered  Temperature every 4 hours or as required.  Follow hospital protocol for further orders re: post thrombolytic therapy patient management.  No antithrombotics or anticoagulants (including but not limited to: heparin, warfarin, aspirin, clopidigrel, or dipyridamole) for 24 hours, then start antithrombotics as ordered by treating physician       10/11 patient was downgraded to acute care unit patient will continue on aspirin and Plavix for 3 months cardiology consulted for PFO and history sudden death in the family due to cardiac arrhythmia.

## 2024-10-12 NOTE — PT/OT/SLP PROGRESS
"Physical Therapy Treatment    Patient Name:  Agnes Worley   MRN:  [id number]    Recommendations:     Discharge Recommendations: Low Intensity Therapy (OP PT)  Discharge Equipment Recommendations: none  Barriers to discharge: None    Assessment:     Agnes Worley is a 59 y.o. female admitted with a medical diagnosis of Acute ischemic right PCA stroke.  She presents with the following impairments/functional limitations: weakness, impaired endurance, gait instability, impaired balance, decreased lower extremity function, decreased upper extremity function. Pt able to perform 2 ambulation training trials ~100 ft and ~150 ft without AD requiring close SBA. Recommending low intensity therapy out patient physical therapy upon discharge.    Rehab Prognosis: Good; patient would benefit from acute skilled PT services to address these deficits and reach maximum level of function.    Recent Surgery: * No surgery found *      Plan:     During this hospitalization, patient to be seen 2 x/week to address the identified rehab impairments via gait training, therapeutic activities, therapeutic exercises, neuromuscular re-education and progress toward the following goals:    Plan of Care Expires:  11/11/24    Subjective     Chief Complaint: None Expressed  Patient/Family Comments/goals: "I feel so much better today".  Pain/Comfort:  Pain Rating 1: 0/10  Pain Rating Post-Intervention 1: 0/10      Objective:     Communicated with nurse prior to session.  Patient found HOB elevated with bed alarm, telemetry upon PT entry to room.     General Precautions: Standard, fall  Orthopedic Precautions: N/A  Braces: N/A  Respiratory Status: Room air     Functional Mobility:  Bed Mobility:     Rolling Right: modified independence  Scooting: modified independence  Supine to Sit: supervision  Sit to Supine: supervision  Transfers:     Sit to Stand:  stand by assistance with hand-held assist  Gait: 2 trials ~100 ft and ~150 ft without AD " requiring close SBA      AM-PAC 6 CLICK MOBILITY  Turning over in bed (including adjusting bedclothes, sheets and blankets)?: 4  Sitting down on and standing up from a chair with arms (e.g., wheelchair, bedside commode, etc.): 4  Moving from lying on back to sitting on the side of the bed?: 4  Moving to and from a bed to a chair (including a wheelchair)?: 3  Need to walk in hospital room?: 3  Climbing 3-5 steps with a railing?: 3  Basic Mobility Total Score: 21       Treatment & Education:  Pt able to perform 2 ambulation training trials as documented above. Instructed in and performed 1 x 15 reps BLE's of standing therapeutic exercises on outside balcony with hands resting on railing/ledge requiring SBA. Exercises consisted of heel raises, hip add/abd and knee flexion(hamstring curls). Also, instructed in and performed side stepping right to left and left to right 1 x 10 steps each way with SBA/CGA. Pt 's family arrived out of elevator as pt ambulating back to her room.    Patient left HOB elevated with all lines intact, call button in reach, nurse notified, and family present..    GOALS:   Multidisciplinary Problems       Physical Therapy Goals          Problem: Physical Therapy    Goal Priority Disciplines Outcome Interventions   Physical Therapy Goal     PT, PT/OT Progressing    Description: Goals to be met by: 24     Patient will increase functional independence with mobility by performin. Supine to sit with Ada  2. Sit to supine with Ada    3. Sit to stand transfer with Ada with no AD.  4. Bed to chair transfer with Ada using No Assistive Device  5. Gait  x 150 feet with Ada using No Assistive Device.                          Time Tracking:     PT Received On: 10/12/24  PT Start Time: 1023     PT Stop Time: 1051  PT Total Time (min): 28 min     Billable Minutes: Gait Training 18 and Therapeutic Exercise 10    Treatment Type: Treatment  PT/PTA: PTA      Number of PTA visits since last PT visit: 1     10/12/2024

## 2024-10-12 NOTE — ASSESSMENT & PLAN NOTE
Patients blood pressure range in the last 24 hours was: BP  Min: 100/54  Max: 200/101.The patient's inpatient anti-hypertensive regimen is listed below:  Current Antihypertensives  labetaloL injection 10 mg, Every 4 hours PRN, Intravenous  metoprolol succinate (TOPROL-XL) 24 hr split tablet 12.5 mg, Daily, Oral    Plan  - BP is controlled, no changes needed to their regimen  - keep SBP<180  - low-sodium diet  - on DC will restat home meds lisinopril

## 2024-10-12 NOTE — PLAN OF CARE
Problem: Physical Therapy  Goal: Physical Therapy Goal  Description: Goals to be met by: 24     Patient will increase functional independence with mobility by performin. Supine to sit with [place]  2. Sit to supine with [place]    3. Sit to stand transfer with [place] with no AD.  4. Bed to chair transfer with [place] using No Assistive Device  5. Gait  x 150 feet with [place] using No Assistive Device.     Outcome: Progressing   Pt continues to work toward all goals. Able to perform ambulation training by 2 trials ~100 ft and ~150 ft without AD requiring close SBA.

## 2024-10-12 NOTE — SUBJECTIVE & OBJECTIVE
interval History: Patient was seen in the morning A&O x4 denies any pain or weakness worked with PT and OT patient to continue monitored in the ICU and then to be downgraded to the acute unit.    Review of Systems   Constitutional:  Negative for activity change, chills and fever.   HENT:  Negative for congestion, rhinorrhea and sore throat.    Eyes:  Negative for discharge and redness.   Respiratory:  Negative for cough, chest tightness, shortness of breath and wheezing.    Cardiovascular:  Negative for chest pain, palpitations and leg swelling.   Gastrointestinal:  Negative for abdominal pain, constipation, diarrhea, nausea and vomiting.   Genitourinary:  Negative for dysuria, flank pain and hematuria.   Musculoskeletal:  Negative for back pain, myalgias and neck pain.   Skin:  Negative for pallor, rash and wound.   Neurological:  Negative for dizziness, tremors, syncope, weakness, numbness and headaches.        Weakness and numbness on left side of face, left upper and lower extremities   Psychiatric/Behavioral:  Negative for agitation, confusion and hallucinations.      Objective:     Vital Signs (Most Recent):  Temp: 98.4 °F (36.9 °C) (10/11/24 1848)  Pulse: 68 (10/11/24 1848)  Resp: 17 (10/11/24 1848)  BP: 124/67 (10/11/24 1848)  SpO2: (!) 94 % (10/11/24 1848) Vital Signs (24h Range):  Temp:  [97.6 °F (36.4 °C)-98.5 °F (36.9 °C)] 98.4 °F (36.9 °C)  Pulse:  [56-91] 68  Resp:  [11-45] 17  SpO2:  [93 %-98 %] 94 %  BP: (100-181)/(51-90) 124/67     Weight: 62.7 kg (138 lb 3.7 oz)  Body mass index is 27 kg/m².    Intake/Output Summary (Last 24 hours) at 10/11/2024 1924  Last data filed at 10/11/2024 1700  Gross per 24 hour   Intake 45.26 ml   Output 1400 ml   Net -1354.74 ml         Physical Exam  Vitals and nursing note reviewed.   Constitutional:       General: She is not in acute distress.  HENT:      Head: Normocephalic and atraumatic.      Mouth/Throat:      Mouth: Mucous membranes are moist.   Eyes:       General:         Right eye: No discharge.         Left eye: No discharge.      Conjunctiva/sclera: Conjunctivae normal.   Cardiovascular:      Rate and Rhythm: Normal rate and regular rhythm.      Pulses: Normal pulses.   Pulmonary:      Effort: Pulmonary effort is normal.      Breath sounds: Normal breath sounds.   Abdominal:      General: Bowel sounds are normal.      Palpations: Abdomen is soft.   Musculoskeletal:         General: No swelling. Normal range of motion.      Cervical back: Normal range of motion and neck supple.      Right lower leg: No edema.      Left lower leg: No edema.   Skin:     General: Skin is warm and dry.   Neurological:      Mental Status: She is alert and oriented to person, place, and time. Mental status is at baseline.      Motor: No weakness.   Psychiatric:         Mood and Affect: Mood normal.             Significant Labs: All pertinent labs within the past 24 hours have been reviewed.  Recent Lab Results         10/11/24  1118   10/11/24  0501        A2C EF 61         A4C EF 63         Albumin   3.4       ALP   63       ALT   11       Anion Gap   8       Ascending aorta 2.94         Ao peak sanjay 1.1         Ao VTI 24.1         PTT   27.1  Comment: Refer to local heparin nomogram for intensity/dose specific   therapeutic   range.  LOT^050^APTT FSL^[id number]         AST   15       AV valve area 2.6         FANG by Velocity Ratio 2.8         AV mean gradient 3.2         AV index (prosthetic) 0.75         AV peak gradient 4.8         AV Velocity Ratio 0.82         Baso #   0.04       Basophil %   0.5       BILIRUBIN TOTAL   0.4  Comment: For infants and newborns, interpretation of results should be based  on gestational age, weight and in agreement with clinical  observations.    Premature Infant recommended reference ranges:  Up to 24 hours.............<8.0 mg/dL  Up to 48 hours............<12.0 mg/dL  3-5 days..................<15.0 mg/dL  6-29 days.................<15.0 mg/dL         BSA  1.66         BUN   15       Calcium   8.7       Chloride   108       Cholesterol Total   194  Comment: The National Cholesterol Education Program (NCEP) has set the  following guidelines (reference ranges) for Cholesterol:  Optimal.....................<200 mg/dL  Borderline High.............200-239 mg/dL  High........................> or = 240 mg/dL         CO2   22       Creatinine   0.7       Left Ventricle Relative Wall Thickness 0.37         Differential Method   Automated       E/A ratio 0.74         E/E' ratio 10.83         eGFR   >60       Eos #   0.3       Eos %   3.1       Estimated Avg Glucose   105       E wave deceleration time 280.40         FS 32.6         Glucose   99       Gran # (ANC)   4.1       Gran %   48.7       HDL   56  Comment: The National Cholesterol Education Program (NCEP) has set the  following guidelines (reference values) for HDL Cholesterol:  Low...............<40 mg/dL  Optimal...........>60 mg/dL         HDL/Cholesterol Ratio   28.9       Hematocrit   36.7       Hemoglobin   12.3       Hemoglobin A1C External   5.3  Comment: ADA Screening Guidelines:  5.7-6.4%  Consistent with prediabetes  >or=6.5%  Consistent with diabetes    High levels of fetal hemoglobin interfere with the HbA1C  assay. Heterozygous hemoglobin variants (HbS, HgC, etc)do  not significantly interfere with this assay.   However, presence of multiple variants may affect accuracy.         Immature Grans (Abs)   0.02  Comment: Mild elevation in immature granulocytes is non specific and   can be seen in a variety of conditions including stress response,   acute inflammation, trauma and pregnancy. Correlation with other   laboratory and clinical findings is essential.         Immature Granulocytes   0.2       INR   1.0  Comment: Coumadin Therapy:  2.0 - 3.0 for INR for all indicators except mechanical heart valves  and antiphospholipid syndromes which should use 2.5 - 3.5.  LOT^040^PT Inn^[id number]         IVC diameter  "1.03         IVSd 0.9         LA area A2C 12.55         LA area A4C 14.04         LA size 3.41         LA Vol 33.21         ASHLEY (MOD) 20.5         LVOT area 3.5         LDL Cholesterol   123.4  Comment: The National Cholesterol Education Program (NCEP) has set the  following guidelines (reference values) for LDL Cholesterol:  Optimal.......................<130 mg/dL  Borderline High...............130-159 mg/dL  High..........................160-189 mg/dL  Very High.....................>190 mg/dL         LV LATERAL E/E' RATIO 9.29         LV SEPTAL E/E' RATIO 13.00         LV EDV BP 83.28         LV Diastolic Volume Index 51.41         Left Ventricular End Diastolic Volume by Teichholz Method 83.28         LV EDV A2C [id number]         LV EDV A4C 56.69         Left Ventricular End Systolic Volume by Teichholz Method 33.14         LV ESV A2C 30.84         LV ESV A4C 34.81         LVIDd 4.3         LVIDs 2.9         LV mass 114.2         LV Mass Index 70.5         Left Ventricular Outflow Tract Mean Gradient 1.61         Left Ventricular Outflow Tract Mean Velocity 0.61         LVOT diameter 2.1         LVOT peak sanjay 0.9         LVOT stroke volume 62.7         LVOT peak VTI 18.1         LV ESV BP 33.14         LV Systolic Volume Index 20.5         Lymph #   3.4       Lymph %   39.7       Magnesium    2.0       MCH   28.0       MCHC   33.5       MCV   84       Mean e' 0.06         Mono #   0.7       Mono %   7.8       MPV   9.5       MV valve area p 1/2 method 2.71         MV "A" wave duration [id number]         MV valve area by continuity eq 2.82         MV peak gradient 3         MV Peak A Sanjay 0.88         MV Peak E Sanjay 0.65         MV stenosis pressure 1/2 time 81.32         MV VTI 22.2         Non-HDL Cholesterol   138  Comment: Risk category and Non-HDL cholesterol goals:  Coronary heart disease (CHD)or equivalent (10-year risk of CHD >20%):  Non-HDL cholesterol goal     <130 mg/dL  Two or more " CHD risk factors and 10-year risk of CHD <= 20%:  Non-HDL cholesterol goal     <160 mg/dL  0 to 1 CHD risk factor:  Non-HDL cholesterol goal     <190 mg/dL         nRBC   0       Garcia's Biplane MOD Ejection Fraction 62         Phosphorus Level   4.4       Platelet Count   227       Potassium   3.6       PROTEIN TOTAL   6.3       PT   10.8       Pulmonary Valve Mean Velocity 0.61         PV peak gradient 2         PV Peak D Carlos 0.29         PV Peak S Carlos 0.51         PV PEAK VELOCITY 0.74         Pulm vein S/D ratio 1.76         PW 0.8         RA Vol 32.58         RA Area 12.4         Est. RA pres 3         RA area length vol 29.89         RBC   4.39       RDW   12.3       RV S' 10.06         RV TB RVSP 5         Sinus 3.00         Sodium   138       STJ 2.85         TAPSE 1.99         TDI SEPTAL 0.05         TDI LATERAL 0.07         Total Cholesterol/HDL Ratio   3.5       Triglycerides   73  Comment: The National Cholesterol Education Program (NCEP) has set the  following guidelines (reference values) for triglycerides:  Normal......................<150 mg/dL  Borderline High.............150-199 mg/dL  High........................200-499 mg/dL         Triscuspid Valve Regurgitation Peak Gradient 17         TR Max Carlos 2.04         Troponin I   <0.006  Comment: The reference interval for Troponin I represents the 99th percentile   cutoff   for our facility and is consistent with 3rd generation assay   performance.         TSH   3.140       TV resting pulmonary artery pressure 20         WBC   8.44       ZLVIDD -0.64         ZLVIDS 0.16                 Significant Imaging: I have reviewed all pertinent imaging results/findings within the past 24 hours.

## 2024-10-12 NOTE — ASSESSMENT & PLAN NOTE
Patient was developed left facial, left upper extremity and left lower extremity weakness this morning.  Symptoms resolved after TNK  Antithrombotics for secondary stroke prevention:  We will start antiplatelets and anticoagulation after 24 hours, due to patient receiving TNK    Statins for secondary stroke prevention and hyperlipidemia, if present:   Statins: Atorvastatin- 40 mg daily    Aggressive risk factor modification: HTN, family history         Rehab efforts: The patient has been evaluated by a stroke team provider and the therapy needs have been fully considered based off the presenting complaints and exam findings. The following therapy evaluations are needed: PT evaluate and treat, OT evaluate and treat, SLP evaluate and treat      Echo Saline Bubble? Yes    Result Date: 10/11/2024    Left Ventricle: The left ventricle is normal in size. Normal wall   thickness. There is normal systolic function. Biplane (2D) method of discs   ejection fraction is 62%. There is normal diastolic function.    Right Ventricle: Normal right ventricular cavity size. Wall thickness   is normal. Systolic function is normal.    Left Atrium: Patent foramen ovale visualized with predominant right to   left shunting.    Mitral Valve: There is mild regurgitation.    Pulmonary Artery: The estimated pulmonary artery systolic pressure is   20 mmHg.    IVC/SVC: Normal venous pressure at 3 mmHg.       Diagnostics ordered/pending: CT scan of head without contrast to asses brain parenchyma, HgbA1C to assess blood glucose levels, Lipid Profile to assess cholesterol levels, MRI head without contrast to assess brain parenchyma, TTE to assess cardiac function/status , TSH to assess thyroid function    VTE prophylaxis: Mechanical prophylaxis: Place SCDs  We will start chemical prophylaxis after 24 hours    BP parameters: Infarct: Post Thrombolytic therapy, SBP <180    -start aspirin and Plavix after 24 hours post TNK  -neurology consult  pending    -CT showed: 8 mm sclerotic lesion in the T4 vertebral body.  Patient follow-up with MRI thoracic spine without and with contrast is suggested.    Tele neurology was consulted and recommended:    -IV Tenecteplase 0.25mg/kg IV push (max dose 25mg); If Tenecteplase is not available use Alteplase 0.9mg/kg IV bolus followed by infusion (max dose 90mg)   -BP goal prior to IV thrombolytics - <185/110 - Initiate BP management prior to treatment if not at goal  -BP goal post IV thrombolytics - <180/105 for at least 24 hrs. - Continue BP management to maintain goal     Additional Recommendations:   Neurological assessment and vital signs (except temperature) every 15 minutes x 2 hours, then every 30 minutes x 6 hours, then every hour x 16 hours..  Frequency of BP assessments may need to be increased if systolic BP stays >= 180 mm Hg or diastolic BP stays >= 105 mm Hg. Administer antihypertensive meds as ordered  Temperature every 4 hours or as required.  Follow hospital protocol for further orders re: post thrombolytic therapy patient management.  No antithrombotics or anticoagulants (including but not limited to: heparin, warfarin, aspirin, clopidigrel, or dipyridamole) for 24 hours, then start antithrombotics as ordered by treating physician       10/11 patient was downgraded to acute care unit patient will continue on aspirin and Plavix for 3 months cardiology consulted for PFO and history sudden death in the family due to cardiac arrhythmia.  10/12 pt will f/u with cards as out pt for loop recorder and  HENRY

## 2024-10-14 DIAGNOSIS — R29.898 WEAKNESS OF LEFT UPPER EXTREMITY: ICD-10-CM

## 2024-10-14 DIAGNOSIS — R29.818 ACUTE FOCAL NEUROLOGICAL DEFICIT: ICD-10-CM

## 2024-10-14 DIAGNOSIS — I63.9 ACUTE CVA (CEREBROVASCULAR ACCIDENT): ICD-10-CM

## 2024-10-14 DIAGNOSIS — I10 ESSENTIAL HYPERTENSION: ICD-10-CM

## 2024-10-14 DIAGNOSIS — I63.89 OTHER CEREBRAL INFARCTION: ICD-10-CM

## 2024-10-14 DIAGNOSIS — I63.531 ACUTE ISCHEMIC RIGHT PCA STROKE: Primary | ICD-10-CM

## 2024-10-15 LAB
OHS QRS DURATION: 72 MS
OHS QTC CALCULATION: 459 MS

## 2024-10-16 ENCOUNTER — TELEPHONE (OUTPATIENT)
Dept: CARDIOLOGY | Facility: CLINIC | Age: 59
End: 2024-10-16
Payer: MEDICAID

## 2024-10-16 DIAGNOSIS — Q21.12 PFO (PATENT FORAMEN OVALE): Primary | ICD-10-CM

## 2024-10-16 NOTE — TELEPHONE ENCOUNTER
----- Message from Bal sent at 10/16/2024  9:41 AM CDT -----  .Type:  Needs Medical Advice    Who Called: pt    Would the patient rather a call back or a response via [facility]? Call back  Best Call Back Number: [phone]  Additional Information:     Pt stated she missed a call and would like a call back

## 2024-10-16 NOTE — TELEPHONE ENCOUNTER
Spoke with patient. Assisted patient scheduling event monitor and follow up appointment.   Patient v/u. Advise patient all appointment reminder will be send to address on file.     Daisy HOGUE     ----- Message from Darek Rodriguez MD sent at 10/12/2024  1:50 PM CDT -----  Hi all,    Similar for this one:     Trini: can we schedule HENRY in 1-3 weeks as outpatient?  Daisy: can we schedule next available holter or zio monitor (whichever is fastest) and then follow up with me after that is complete?    Thx yall!

## 2024-11-05 ENCOUNTER — LAB VISIT (OUTPATIENT)
Dept: LAB | Facility: HOSPITAL | Age: 59
End: 2024-11-05
Attending: INTERNAL MEDICINE
Payer: MEDICAID

## 2024-11-05 ENCOUNTER — TELEPHONE (OUTPATIENT)
Dept: CARDIOLOGY | Facility: CLINIC | Age: 59
End: 2024-11-05
Payer: MEDICAID

## 2024-11-05 DIAGNOSIS — Z01.810 PRE-PROCEDURAL CARDIOVASCULAR EXAMINATION: ICD-10-CM

## 2024-11-05 LAB
ANION GAP SERPL CALC-SCNC: 9 MMOL/L (ref 8–16)
BASOPHILS # BLD AUTO: 0.04 K/UL (ref 0–0.2)
BASOPHILS NFR BLD: 0.5 % (ref 0–1.9)
BUN SERPL-MCNC: 14 MG/DL (ref 6–20)
CALCIUM SERPL-MCNC: 9.5 MG/DL (ref 8.7–10.5)
CHLORIDE SERPL-SCNC: 108 MMOL/L (ref 95–110)
CO2 SERPL-SCNC: 23 MMOL/L (ref 23–29)
CREAT SERPL-MCNC: 0.8 MG/DL (ref 0.5–1.4)
DIFFERENTIAL METHOD BLD: ABNORMAL
EOSINOPHIL # BLD AUTO: 0.1 K/UL (ref 0–0.5)
EOSINOPHIL NFR BLD: 1.4 % (ref 0–8)
ERYTHROCYTE [DISTWIDTH] IN BLOOD BY AUTOMATED COUNT: 12.2 % (ref 11.5–14.5)
EST. GFR  (NO RACE VARIABLE): >60 ML/MIN/1.73 M^2
GLUCOSE SERPL-MCNC: 99 MG/DL (ref 70–110)
HCT VFR BLD AUTO: 42 % (ref 37–48.5)
HGB BLD-MCNC: 13.3 G/DL (ref 12–16)
IMM GRANULOCYTES # BLD AUTO: 0.03 K/UL (ref 0–0.04)
IMM GRANULOCYTES NFR BLD AUTO: 0.3 % (ref 0–0.5)
LYMPHOCYTES # BLD AUTO: 3.3 K/UL (ref 1–4.8)
LYMPHOCYTES NFR BLD: 38.7 % (ref 18–48)
MCH RBC QN AUTO: 27.5 PG (ref 27–31)
MCHC RBC AUTO-ENTMCNC: 31.7 G/DL (ref 32–36)
MCV RBC AUTO: 87 FL (ref 82–98)
MONOCYTES # BLD AUTO: 0.5 K/UL (ref 0.3–1)
MONOCYTES NFR BLD: 6.2 % (ref 4–15)
NEUTROPHILS # BLD AUTO: 4.6 K/UL (ref 1.8–7.7)
NEUTROPHILS NFR BLD: 52.9 % (ref 38–73)
NRBC BLD-RTO: 0 /100 WBC
PLATELET # BLD AUTO: 268 K/UL (ref 150–450)
PMV BLD AUTO: 10.1 FL (ref 9.2–12.9)
POTASSIUM SERPL-SCNC: 4.1 MMOL/L (ref 3.5–5.1)
RBC # BLD AUTO: 4.84 M/UL (ref 4–5.4)
SODIUM SERPL-SCNC: 140 MMOL/L (ref 136–145)
WBC # BLD AUTO: 8.61 K/UL (ref 3.9–12.7)

## 2024-11-05 PROCEDURE — 80048 BASIC METABOLIC PNL TOTAL CA: CPT | Performed by: INTERNAL MEDICINE

## 2024-11-05 PROCEDURE — 85025 COMPLETE CBC W/AUTO DIFF WBC: CPT | Performed by: INTERNAL MEDICINE

## 2024-11-05 PROCEDURE — 36415 COLL VENOUS BLD VENIPUNCTURE: CPT | Performed by: INTERNAL MEDICINE

## 2024-11-05 NOTE — TELEPHONE ENCOUNTER
Spoke with patient . Advise  I will mail out follow up appointment.  Advise  to reach out to the cath lab [phone] for information regarding procedure.   Patient  verbalized understanding.     Daisy HOGUE     ----- Message from Ximena sent at 11/5/2024  1:31 PM CST -----  Type:  Sooner Apoointment Request    Caller is requesting a sooner appointment.  Caller declined first available appointment listed below.  Caller will not accept being placed on the waitlist and is requesting a message be sent to doctor.  Name of Caller:pt  When is the first available appointment?n/a  Symptoms:HFU DX: Shunt/CVA  Would the patient rather a call back or a response via [facility]? Call   Best Call Back Number:[phone] [facility] [phone]  Additional Information:

## 2024-11-07 ENCOUNTER — HOSPITAL ENCOUNTER (OUTPATIENT)
Facility: HOSPITAL | Age: 59
Discharge: HOME | End: 2024-11-07
Attending: INTERNAL MEDICINE | Admitting: INTERNAL MEDICINE
Payer: MEDICAID

## 2024-11-07 ENCOUNTER — ANESTHESIA (OUTPATIENT)
Dept: CARDIOLOGY | Facility: HOSPITAL | Age: 59
End: 2024-11-07
Payer: MEDICAID

## 2024-11-07 ENCOUNTER — HOSPITAL ENCOUNTER (OUTPATIENT)
Dept: CARDIOLOGY | Facility: HOSPITAL | Age: 59
Discharge: HOME | End: 2024-11-07
Attending: INTERNAL MEDICINE | Admitting: INTERNAL MEDICINE
Payer: MEDICAID

## 2024-11-07 ENCOUNTER — ANESTHESIA EVENT (OUTPATIENT)
Dept: CARDIOLOGY | Facility: HOSPITAL | Age: 59
End: 2024-11-07
Payer: MEDICAID

## 2024-11-07 VITALS
HEART RATE: 86 BPM | OXYGEN SATURATION: 98 % | DIASTOLIC BLOOD PRESSURE: 62 MMHG | BODY MASS INDEX: 27.29 KG/M2 | SYSTOLIC BLOOD PRESSURE: 128 MMHG | HEIGHT: 60 IN | RESPIRATION RATE: 16 BRPM | WEIGHT: 139 LBS | TEMPERATURE: 98 F

## 2024-11-07 DIAGNOSIS — Q21.12 PFO (PATENT FORAMEN OVALE): ICD-10-CM

## 2024-11-07 DIAGNOSIS — Z01.810 PRE-PROCEDURAL CARDIOVASCULAR EXAMINATION: Primary | ICD-10-CM

## 2024-11-07 LAB — BSA FOR ECHO PROCEDURE: 1.63 M2

## 2024-11-07 PROCEDURE — 63600175 PHARM REV CODE 636 W HCPCS: Performed by: NURSE ANESTHETIST, CERTIFIED REGISTERED

## 2024-11-07 PROCEDURE — 93320 DOPPLER ECHO COMPLETE: CPT

## 2024-11-07 PROCEDURE — 99238 HOSP IP/OBS DSCHRG MGMT 30/<: CPT | Mod: ,,, | Performed by: INTERNAL MEDICINE

## 2024-11-07 PROCEDURE — 25000003 PHARM REV CODE 250: Performed by: NURSE ANESTHETIST, CERTIFIED REGISTERED

## 2024-11-07 PROCEDURE — 37000008 HC ANESTHESIA 1ST 15 MINUTES: Performed by: INTERNAL MEDICINE

## 2024-11-07 PROCEDURE — 37000009 HC ANESTHESIA EA ADD 15 MINS: Performed by: INTERNAL MEDICINE

## 2024-11-07 RX ORDER — TOPICAL ANESTHETIC 200 MG/ML
SPRAY DENTAL; PERIODONTAL
Status: DISCONTINUED | OUTPATIENT
Start: 2024-11-07 | End: 2024-11-07

## 2024-11-07 RX ORDER — SODIUM CHLORIDE 0.9 % (FLUSH) 0.9 %
10 SYRINGE (ML) INJECTION
Status: DISCONTINUED | OUTPATIENT
Start: 2024-11-07 | End: 2024-11-07 | Stop reason: HOSPADM

## 2024-11-07 RX ORDER — SODIUM CHLORIDE 9 MG/ML
INJECTION, SOLUTION INTRAVENOUS CONTINUOUS
Status: DISCONTINUED | OUTPATIENT
Start: 2024-11-07 | End: 2024-11-07 | Stop reason: HOSPADM

## 2024-11-07 RX ORDER — DEXMEDETOMIDINE HYDROCHLORIDE 100 UG/ML
INJECTION, SOLUTION INTRAVENOUS
Status: DISCONTINUED | OUTPATIENT
Start: 2024-11-07 | End: 2024-11-07

## 2024-11-07 RX ORDER — PROPOFOL 10 MG/ML
VIAL (ML) INTRAVENOUS CONTINUOUS PRN
Status: DISCONTINUED | OUTPATIENT
Start: 2024-11-07 | End: 2024-11-07

## 2024-11-07 RX ORDER — LIDOCAINE HYDROCHLORIDE 20 MG/ML
INJECTION, SOLUTION EPIDURAL; INFILTRATION; INTRACAUDAL; PERINEURAL
Status: DISCONTINUED | OUTPATIENT
Start: 2024-11-07 | End: 2024-11-07

## 2024-11-07 RX ORDER — PROPOFOL 10 MG/ML
VIAL (ML) INTRAVENOUS
Status: DISCONTINUED | OUTPATIENT
Start: 2024-11-07 | End: 2024-11-07

## 2024-11-07 RX ADMIN — TOPICAL ANESTHETIC 1 EACH: 200 SPRAY DENTAL; PERIODONTAL at 10:11

## 2024-11-07 RX ADMIN — DEXMEDETOMIDINE 8 MCG: 200 INJECTION, SOLUTION INTRAVENOUS at 10:11

## 2024-11-07 RX ADMIN — LIDOCAINE HYDROCHLORIDE 80 MG: 20 INJECTION, SOLUTION EPIDURAL; INFILTRATION; INTRACAUDAL; PERINEURAL at 10:11

## 2024-11-07 RX ADMIN — PROPOFOL 150 MCG/KG/MIN: 10 INJECTION, EMULSION INTRAVENOUS at 10:11

## 2024-11-07 RX ADMIN — LIDOCAINE HYDROCHLORIDE 20 MG: 20 INJECTION, SOLUTION EPIDURAL; INFILTRATION; INTRACAUDAL; PERINEURAL at 11:11

## 2024-11-07 RX ADMIN — PROPOFOL 60 MG: 10 INJECTION, EMULSION INTRAVENOUS at 10:11

## 2024-11-07 NOTE — DISCHARGE SUMMARY
[facility] - Cath Lab (Hospital)  Discharge Note  Short Stay    Procedure(s) (LRB):  Transesophageal echo (HENRY) intra-procedure log documentation (N/A)      OUTCOME: Patient tolerated treatment/procedure well without complication and is now ready for discharge.    DISPOSITION: Home or Self Care    FINAL DIAGNOSIS:  <principal problem not specified>    FOLLOWUP: In clinic    DISCHARGE INSTRUCTIONS:  Given to patient    TIME SPENT ON DISCHARGE: 14 minutes

## 2024-11-07 NOTE — TRANSFER OF CARE
Anesthesia Transfer of Care Note    Patient: Agnes Worley    Procedure(s) Performed: Procedure(s) (LRB):  Transesophageal echo (HENRY) intra-procedure log documentation (N/A)    Patient location: Cath Lab    Transport from OR: Transported from OR on 6-10 L/min O2 by face mask with adequate spontaneous ventilation    Post pain: adequate analgesia    Post assessment: no apparent anesthetic complications    Post vital signs: stable    Level of consciousness: awake and alert    Nausea/Vomiting: no nausea/vomiting    Complications: none    Transfer of care protocol was followed      Last vitals: Visit Vitals  BP (!) 142/66 (BP Location: Left arm, Patient Position: Lying)   Pulse 77   Temp 36.5 °C (97.7 °F) (Temporal)   Resp 16   Ht 5' (1.524 m)   Wt 63 kg (139 lb)   SpO2 96%   Breastfeeding No   BMI 27.15 kg/m²      Osteoartritis   LO QUE NECESITA SABER:   La osteoartritis ocurre cuando el cartílago (tejido que amortigua leelee articulación) se desgasta lentamente y causa que los huesos rocen entre ellos  Esta enfermedad es leelee afección a bri plazo que con frecuencia afecta las liv, che, parte baja de la espalda, rodillas y caderas  La osteoartritis también se conoce purvi artrosis o enfermedad degenerativa de las articulaciones  INSTRUCCIONES SOBRE EL BRITNI HOSPITALARIA:   Regrese a la miguel de emergencias si:   · Usted tiene dolor intenso  · Usted no puede  la articulación  Pregúntele a jackson Janet Hung vitaminas y minerales son adecuados para usted  · Usted tiene fiebre  · Jackson articulación [place] y sensible  · Usted tiene preguntas o inquietudes acerca de jackson condición o cuidado  Medicamentos:   · El acetaminofén  sirve para reducir el dolor  Está disponible sin receta médica  Pregunte la cantidad y la frecuencia con que debe tomarlos  [address]  El acetaminofén puede causar daño en el hígado cuando no se nicole de forma correcta  · AINEs (Analgésicos antiinflamatorios no esteroides) purvi el ibuprofeno, ayudan a disminuir la inflamación, el dolor y la [place]  Emily medicamento esta disponible con o sin leelee receta médica  Los AINEs pueden causar sangrado estomacal o problemas renales en ciertas personas  Si usted nicole un medicamento anticoagulante, siempre pregúntele a jackson médico si los GRACIELA son seguros para usted  Siempre lydia la etiqueta de emily medicamento y [place] instrucciones  · Un medicamento con receta para el dolor  podría administrarse para disminuir el dolor intenso si otros medicamentos no funcionan  [place] el medicamento purvi se le indique  No espere a que el dolor sea muy intenso para heather el medicamento  · [place] angelito medicamentos purvi se le haya indicado  Consulte con jackson médico si usted toby que jackson medicamento no le está ayudando o si presenta efectos secundarios   Infórmele si es alérgico a cualquier medicamento  Mantenga leelee lista actualizada de los [place], las vitaminas y los productos herbales que nicole  Incluya los siguientes datos de los medicamentos: cantidad, frecuencia y motivo de administración  Traiga con usted la lista o los envases de la píldoras a angelito citas de seguimiento  Lleve la lista de los medicamentos con usted en emiliana de leelee emergencia  Acuda a angelito consultas de control con jackson médico según le indicaron  Anote angelito preguntas para que se acuerde de hacerlas mario angelito visitas  Acuda a la terapia física purvi se le indique: Un fisioterapeuta le enseñará los ejercicios para mejorar el movimiento y la [place], con el fin de disminuir el dolor en las articulaciones  Los ejercicios también ayudan a [employer] de pérdida de función  Controle jackson osteoartritis:   · Manténgase activo  La actividad física puede reducir el dolor y mejorar jackson habilidad de hacer angelito actividades diarias  Evite actividades que le causen Rama Carrington  Pregúntele a jackson médico qué tipos de ejercicios son los adecuados para usted  · Mantenga un peso saludable  [place] ayuda a disminuir la presión en las articulaciones en jackson espalda, caderas, rodillas, tobillos y pies  Consulte con jackson médico cuánto debería pesar  Pida que le ayude a crear un plan para bajar de peso si usted tiene sobrepeso  · Aplique calor o hielo  en las articulaciones [address] indicaciones  El calor o el hielo pueden ayudar a disminuir el dolor, la inflamación y los espasmos musculares  Use leelee almohadilla de calor eléctrica en temperatura baja o tome leelee ducha tibia  Use un paquete de hielo o ponga hielo molido dentro de [employer]  Cúbrala con leelee toalla  · Dése un masaje  en los músculos alrededor de la articulación para aliviar el dolor y la rigidez  · Use un bastón, unas muletas o leelee caminador  para proteger y aliviar la presión en jackson Terris Cogan y articulaciones de la cadera   [employer] pueden ordenar plantillas ortopédicas para angelito zapatos para disminuir la presión de angelito articulaciones  · Use zapatos sin tacones o de tacones bajos  [place] le servirá para aliviar el dolor y a disminuir la presión que se ejerce en las articulaciones de allen Karlo Feng garibay  © 2017 [facility] Information is for End User's use only and may not be sold, redistributed or otherwise used for commercial purposes  All illustrations and images included in CareNotes® are the copyrighted property of A D A M , Inc  or [place]  Esta información es sólo para uso en educación  Allen intención no es darle un consejo médico sobre enfermedades o tratamientos  Colsulte con allen Loni Seats farmacéutico antes de seguir cualquier régimen médico para saber si es seguro y efectivo para usted

## 2024-11-07 NOTE — PLAN OF CARE
Patient discharged to home as ordered after recovery per Dr. Jennings.     All discharge instructions, printed materials given.    Patient instructed on follow-up appointment as ordered.  Patient verbalized understanding and agreement with all discharge instructions given. Pt is AAOx3, VSS, denies any pain.    Pt up to bathroom and voided without difficulty. Pt got dressed and moving around without difficulty and no distress noted.  Pt in w/c.  Right wrist 20 gauge iv dc'd as ordered with tip intact. salo and koban dressing applied c.d.i.  Pt discharged home via wheelchair to private vehicle by pt's sister.

## 2024-11-07 NOTE — DISCHARGE INSTRUCTIONS
Discharge Instructions:    Do not drive a car, operate heavy equipment, care for a young child, etc for the next 12-24 hours.   Avoid drinking alcohol for 24 hours.  Do not make any important decisions for 24 hours.    Drink fluids to keep hydrated. Resume your usual diet as tolerated.     Rest for today then activity as tolerated.     Call MD for any concerns.    Go to the ER for any difficulty breathing or chest pain.    Follow any additional instructions given to you by MD.      Call MD to schedule a follow up appointment, or follow up as instructed.

## 2024-11-07 NOTE — ANESTHESIA PREPROCEDURE EVALUATION
11/07/2024  Agnes Worley is a 59 y.o., female.      Pre-op Assessment    I have reviewed the Patient Summary Reports.     I have reviewed the Nursing Notes. I have reviewed the NPO Status.      Review of Systems  Anesthesia Hx:   History of prior surgery of interest to airway management or planning:            Denies Personal Hx of Anesthesia complications.                    Cardiovascular:     Hypertension                                    Hypertension         Neurological:   CVA                       CVA - Cerebrovasular Accident                   Physical Exam  General: Well nourished    Airway:  Mallampati: II   Mouth Opening: Normal  Neck ROM: Normal ROM    Anesthesia Plan  Type of Anesthesia, risks & benefits discussed:    Anesthesia Type: Gen Natural Airway  Informed Consent: Informed consent signed with the Patient and all parties understand the risks and agree with anesthesia plan.  All questions answered.   ASA Score: 3    Ready For Surgery From Anesthesia Perspective.   .

## 2024-11-07 NOTE — BRIEF OP NOTE
Brief Procedure Note     Procedure: HENRY   Protocol: After procedural risks and benefits were explained and informed consent  obtained, posterior oropharynx was anesthetized with benzocaine spray. Anesthesia was performed as below. The transesophageal probe was advanced into the oropharynx and down the esophagus  without difficulty and the multiplane HENRY images, color Doppler, and spectral Doppler recordings  were obtained. There were no immediate procedural complications.     Pre-op Dx:   CVA  Post-op Dx:   PFO      Sedation:   Per Anesthesiology Team   Complications:  No immediate complications   Condition:  Stable          Disposition:  Discharge home when back to baseline      Findings:   PFO with color/bubble crossover     Plan:    -f/u in clinic    Darek Rodriguez M.D.  Interventional Cardiology   [facility]

## 2024-11-07 NOTE — PLAN OF CARE
Patient remains cath lab bay #1 on stretcher with side rails up x2. Pt drowsy but / AAOx4 and able to follow commands. Pt is stable when connecting to cardiac monitors. VSS.    +2 angi radial pulses palpated. Skin normal in color and warm to touch, <3 sec cap refill.  Fall risk precautions given and patient acknowledges.  AIDET completed to pt. Updated pt's sister in sx waiting room per Dr. Rodriguez. Will continue to monitor patient.

## 2024-11-07 NOTE — ANESTHESIA POSTPROCEDURE EVALUATION
Anesthesia Post Evaluation    Patient: Agnes Worley    Procedure(s) Performed: Procedure(s) (LRB):  Transesophageal echo (HENRY) intra-procedure log documentation (N/A)    Final Anesthesia Type: general      Patient location during evaluation: PACU  Patient participation: Yes- Able to Participate  Level of consciousness: awake and alert  Post-procedure vital signs: reviewed and stable  Pain management: adequate  Airway patency: patent    PONV status at discharge: No PONV  Anesthetic complications: no      Cardiovascular status: blood pressure returned to baseline  Respiratory status: unassisted  Hydration status: euvolemic              Vitals Value Taken Time   /62 11/07/24 1205   Temp 36.7 11/07/24 1217   Pulse 86 11/07/24 1205   Resp 16 11/07/24 1205   SpO2 98 % 11/07/24 1205         Event Time   Out of Recovery 12:12:00         Pain/Vic Score: Vic Score: 10 (11/7/2024 12:05 PM)

## 2024-11-13 ENCOUNTER — LAB VISIT (OUTPATIENT)
Dept: LAB | Facility: HOSPITAL | Age: 59
End: 2024-11-13
Attending: INTERNAL MEDICINE
Payer: MEDICAID

## 2024-11-13 DIAGNOSIS — Q21.12 PATENT FORAMEN OVALE: ICD-10-CM

## 2024-11-13 DIAGNOSIS — I10 ESSENTIAL HYPERTENSION, MALIGNANT: Primary | ICD-10-CM

## 2024-11-13 LAB
ALBUMIN SERPL BCP-MCNC: 3.8 G/DL (ref 3.5–5.2)
ALP SERPL-CCNC: 73 U/L (ref 40–150)
ALT SERPL W/O P-5'-P-CCNC: 15 U/L (ref 10–44)
ANION GAP SERPL CALC-SCNC: 10 MMOL/L (ref 8–16)
AST SERPL-CCNC: 20 U/L (ref 10–40)
BASOPHILS # BLD AUTO: 0.04 K/UL (ref 0–0.2)
BASOPHILS NFR BLD: 0.5 % (ref 0–1.9)
BILIRUB SERPL-MCNC: 0.5 MG/DL (ref 0.1–1)
BILIRUB UR QL STRIP: NEGATIVE
BUN SERPL-MCNC: 11 MG/DL (ref 6–20)
CALCIUM SERPL-MCNC: 9.5 MG/DL (ref 8.7–10.5)
CHLORIDE SERPL-SCNC: 107 MMOL/L (ref 95–110)
CHOLEST SERPL-MCNC: 201 MG/DL (ref 120–199)
CHOLEST/HDLC SERPL: 2.8 {RATIO} (ref 2–5)
CLARITY UR: CLEAR
CO2 SERPL-SCNC: 24 MMOL/L (ref 23–29)
COLOR UR: YELLOW
CREAT SERPL-MCNC: 0.7 MG/DL (ref 0.5–1.4)
DIFFERENTIAL METHOD BLD: NORMAL
EOSINOPHIL # BLD AUTO: 0.2 K/UL (ref 0–0.5)
EOSINOPHIL NFR BLD: 2.6 % (ref 0–8)
ERYTHROCYTE [DISTWIDTH] IN BLOOD BY AUTOMATED COUNT: 12.2 % (ref 11.5–14.5)
EST. GFR  (NO RACE VARIABLE): >60 ML/MIN/1.73 M^2
ESTIMATED AVG GLUCOSE: 108 MG/DL (ref 68–131)
GLUCOSE SERPL-MCNC: 79 MG/DL (ref 70–110)
GLUCOSE UR QL STRIP: NEGATIVE
HBA1C MFR BLD: 5.4 % (ref 4–5.6)
HCT VFR BLD AUTO: 41.5 % (ref 37–48.5)
HDLC SERPL-MCNC: 73 MG/DL (ref 40–75)
HDLC SERPL: 36.3 % (ref 20–50)
HGB BLD-MCNC: 13.6 G/DL (ref 12–16)
HGB UR QL STRIP: NEGATIVE
IMM GRANULOCYTES # BLD AUTO: 0.02 K/UL (ref 0–0.04)
IMM GRANULOCYTES NFR BLD AUTO: 0.3 % (ref 0–0.5)
KETONES UR QL STRIP: NEGATIVE
LDLC SERPL CALC-MCNC: 114.6 MG/DL (ref 63–159)
LEUKOCYTE ESTERASE UR QL STRIP: NEGATIVE
LYMPHOCYTES # BLD AUTO: 2.7 K/UL (ref 1–4.8)
LYMPHOCYTES NFR BLD: 35.5 % (ref 18–48)
MCH RBC QN AUTO: 27.5 PG (ref 27–31)
MCHC RBC AUTO-ENTMCNC: 32.8 G/DL (ref 32–36)
MCV RBC AUTO: 84 FL (ref 82–98)
MONOCYTES # BLD AUTO: 0.6 K/UL (ref 0.3–1)
MONOCYTES NFR BLD: 8.4 % (ref 4–15)
NEUTROPHILS # BLD AUTO: 4 K/UL (ref 1.8–7.7)
NEUTROPHILS NFR BLD: 52.7 % (ref 38–73)
NITRITE UR QL STRIP: NEGATIVE
NONHDLC SERPL-MCNC: 128 MG/DL
NRBC BLD-RTO: 0 /100 WBC
PH UR STRIP: 5 [PH] (ref 5–8)
PLATELET # BLD AUTO: 294 K/UL (ref 150–450)
PMV BLD AUTO: 9.9 FL (ref 9.2–12.9)
POTASSIUM SERPL-SCNC: 4.5 MMOL/L (ref 3.5–5.1)
PROT SERPL-MCNC: 7.4 G/DL (ref 6–8.4)
PROT UR QL STRIP: NEGATIVE
RBC # BLD AUTO: 4.95 M/UL (ref 4–5.4)
SODIUM SERPL-SCNC: 141 MMOL/L (ref 136–145)
SP GR UR STRIP: 1.02 (ref 1–1.03)
TRIGL SERPL-MCNC: 67 MG/DL (ref 30–150)
TSH SERPL DL<=0.005 MIU/L-ACNC: 1.98 UIU/ML (ref 0.4–4)
URN SPEC COLLECT METH UR: NORMAL
UROBILINOGEN UR STRIP-ACNC: NEGATIVE EU/DL
WBC # BLD AUTO: 7.6 K/UL (ref 3.9–12.7)

## 2024-11-13 PROCEDURE — 36415 COLL VENOUS BLD VENIPUNCTURE: CPT | Performed by: INTERNAL MEDICINE

## 2024-11-13 PROCEDURE — 83036 HEMOGLOBIN GLYCOSYLATED A1C: CPT | Performed by: INTERNAL MEDICINE

## 2024-11-13 PROCEDURE — 80053 COMPREHEN METABOLIC PANEL: CPT | Performed by: INTERNAL MEDICINE

## 2024-11-13 PROCEDURE — 85025 COMPLETE CBC W/AUTO DIFF WBC: CPT | Performed by: INTERNAL MEDICINE

## 2024-11-13 PROCEDURE — 84443 ASSAY THYROID STIM HORMONE: CPT | Performed by: INTERNAL MEDICINE

## 2024-11-13 PROCEDURE — 81003 URINALYSIS AUTO W/O SCOPE: CPT | Performed by: INTERNAL MEDICINE

## 2024-11-13 PROCEDURE — 80061 LIPID PANEL: CPT | Performed by: INTERNAL MEDICINE

## 2024-11-26 ENCOUNTER — CLINICAL SUPPORT (OUTPATIENT)
Dept: CARDIOLOGY | Facility: HOSPITAL | Age: 59
End: 2024-11-26
Attending: INTERNAL MEDICINE
Payer: MEDICAID

## 2024-11-26 DIAGNOSIS — I10 ESSENTIAL HYPERTENSION: ICD-10-CM

## 2024-11-26 DIAGNOSIS — R29.898 WEAKNESS OF LEFT UPPER EXTREMITY: ICD-10-CM

## 2024-11-26 DIAGNOSIS — R29.818 ACUTE FOCAL NEUROLOGICAL DEFICIT: ICD-10-CM

## 2024-11-26 DIAGNOSIS — I63.89 OTHER CEREBRAL INFARCTION: ICD-10-CM

## 2024-11-26 DIAGNOSIS — I63.9 ACUTE CVA (CEREBROVASCULAR ACCIDENT): ICD-10-CM

## 2024-11-26 DIAGNOSIS — I63.531 ACUTE ISCHEMIC RIGHT PCA STROKE: ICD-10-CM

## 2024-11-26 PROCEDURE — 93270 REMOTE 30 DAY ECG REV/REPORT: CPT

## 2025-01-12 ENCOUNTER — PATIENT MESSAGE (OUTPATIENT)
Dept: CARDIOLOGY | Facility: CLINIC | Age: 60
End: 2025-01-12
Payer: MEDICAID

## 2025-04-23 ENCOUNTER — LAB VISIT (OUTPATIENT)
Dept: LAB | Facility: HOSPITAL | Age: 60
End: 2025-04-23
Payer: MEDICAID

## 2025-04-23 DIAGNOSIS — Q93.88 CHROMOSOME 12Q15-Q21.1 MICRODELETION SYNDROME: ICD-10-CM

## 2025-04-23 DIAGNOSIS — F32.A DEPRESSION, UNSPECIFIED DEPRESSION TYPE: ICD-10-CM

## 2025-04-23 DIAGNOSIS — F32.A DEPRESSIVE TYPE PSYCHOSIS: ICD-10-CM

## 2025-04-23 DIAGNOSIS — I10 ESSENTIAL HYPERTENSION: Primary | ICD-10-CM

## 2025-04-23 LAB
ALBUMIN SERPL BCP-MCNC: 3.9 G/DL (ref 3.5–5.2)
ALP SERPL-CCNC: 70 UNIT/L (ref 40–150)
ALT SERPL W/O P-5'-P-CCNC: 10 UNIT/L (ref 10–44)
ANION GAP (OHS): 9 MMOL/L (ref 8–16)
AST SERPL-CCNC: 17 UNIT/L (ref 11–45)
BILIRUB SERPL-MCNC: 0.6 MG/DL (ref 0.1–1)
BILIRUB UR QL STRIP.AUTO: NEGATIVE
BUN SERPL-MCNC: 9 MG/DL (ref 6–20)
CALCIUM SERPL-MCNC: 9.2 MG/DL (ref 8.7–10.5)
CHLORIDE SERPL-SCNC: 106 MMOL/L (ref 95–110)
CHOLEST SERPL-MCNC: 221 MG/DL (ref 120–199)
CHOLEST/HDLC SERPL: 3.5 {RATIO} (ref 2–5)
CLARITY UR: CLEAR
CO2 SERPL-SCNC: 23 MMOL/L (ref 23–29)
COLOR UR AUTO: YELLOW
CREAT SERPL-MCNC: 0.7 MG/DL (ref 0.5–1.4)
EAG (OHS): 108 MG/DL (ref 68–131)
ERYTHROCYTE [DISTWIDTH] IN BLOOD BY AUTOMATED COUNT: 12.1 % (ref 11.5–14.5)
FOLATE SERPL-MCNC: 9.4 NG/ML (ref 4–24)
GFR SERPLBLD CREATININE-BSD FMLA CKD-EPI: >60 ML/MIN/1.73/M2
GLUCOSE SERPL-MCNC: 65 MG/DL (ref 70–110)
GLUCOSE UR QL STRIP: NEGATIVE
HBA1C MFR BLD: 5.4 % (ref 4–5.6)
HCT VFR BLD AUTO: 42.3 % (ref 37–48.5)
HDLC SERPL-MCNC: 63 MG/DL (ref 40–75)
HDLC SERPL: 28.5 % (ref 20–50)
HGB BLD-MCNC: 13.5 GM/DL (ref 12–16)
HGB UR QL STRIP: NEGATIVE
KETONES UR QL STRIP: NEGATIVE
LDLC SERPL CALC-MCNC: 142.6 MG/DL (ref 63–159)
LEUKOCYTE ESTERASE UR QL STRIP: NEGATIVE
MCH RBC QN AUTO: 27.4 PG (ref 27–31)
MCHC RBC AUTO-ENTMCNC: 31.9 G/DL (ref 32–36)
MCV RBC AUTO: 86 FL (ref 82–98)
NITRITE UR QL STRIP: NEGATIVE
NONHDLC SERPL-MCNC: 158 MG/DL
PH UR STRIP: 6 [PH]
PLATELET # BLD AUTO: 328 K/UL (ref 150–450)
PMV BLD AUTO: 9.8 FL (ref 9.2–12.9)
POTASSIUM SERPL-SCNC: 3.8 MMOL/L (ref 3.5–5.1)
PROT SERPL-MCNC: 7.4 GM/DL (ref 6–8.4)
PROT UR QL STRIP: NEGATIVE
RBC # BLD AUTO: 4.93 M/UL (ref 4–5.4)
SODIUM SERPL-SCNC: 138 MMOL/L (ref 136–145)
SP GR UR STRIP: 1.02
T3FREE SERPL-MCNC: 2.7 PG/ML (ref 2.3–4.2)
T4 FREE SERPL-MCNC: 1.1 NG/DL (ref 0.71–1.51)
TRIGL SERPL-MCNC: 77 MG/DL (ref 30–150)
TSH SERPL-ACNC: 1.97 UIU/ML (ref 0.4–4)
UROBILINOGEN UR STRIP-ACNC: NEGATIVE EU/DL
VIT B12 SERPL-MCNC: 702 PG/ML (ref 210–950)
WBC # BLD AUTO: 7.02 K/UL (ref 3.9–12.7)

## 2025-04-23 PROCEDURE — 84425 ASSAY OF VITAMIN B-1: CPT

## 2025-04-23 PROCEDURE — 80053 COMPREHEN METABOLIC PANEL: CPT

## 2025-04-23 PROCEDURE — 82746 ASSAY OF FOLIC ACID SERUM: CPT

## 2025-04-23 PROCEDURE — 84481 FREE ASSAY (FT-3): CPT

## 2025-04-23 PROCEDURE — 82652 VIT D 1 25-DIHYDROXY: CPT

## 2025-04-23 PROCEDURE — 36415 COLL VENOUS BLD VENIPUNCTURE: CPT

## 2025-04-23 PROCEDURE — 84443 ASSAY THYROID STIM HORMONE: CPT

## 2025-04-23 PROCEDURE — 80061 LIPID PANEL: CPT

## 2025-04-23 PROCEDURE — 81003 URINALYSIS AUTO W/O SCOPE: CPT

## 2025-04-23 PROCEDURE — 82607 VITAMIN B-12: CPT

## 2025-04-23 PROCEDURE — 84439 ASSAY OF FREE THYROXINE: CPT

## 2025-04-23 PROCEDURE — 85027 COMPLETE CBC AUTOMATED: CPT

## 2025-04-23 PROCEDURE — 83036 HEMOGLOBIN GLYCOSYLATED A1C: CPT

## 2025-04-25 LAB — W VITAMIN D, 1, 25-DIHYDROXY: 58 PG/ML
